# Patient Record
Sex: FEMALE | Employment: OTHER | URBAN - METROPOLITAN AREA
[De-identification: names, ages, dates, MRNs, and addresses within clinical notes are randomized per-mention and may not be internally consistent; named-entity substitution may affect disease eponyms.]

---
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
## 2023-05-18 ENCOUNTER — OFFICE VISIT (OUTPATIENT)
Dept: BEHAVIORAL HEALTH | Facility: CLINIC | Age: 33
End: 2023-05-18
Payer: COMMERCIAL

## 2023-05-18 VITALS
SYSTOLIC BLOOD PRESSURE: 110 MMHG | RESPIRATION RATE: 12 BRPM | OXYGEN SATURATION: 97 % | DIASTOLIC BLOOD PRESSURE: 70 MMHG | WEIGHT: 133.3 LBS | HEART RATE: 60 BPM | HEIGHT: 66 IN | BODY MASS INDEX: 21.42 KG/M2

## 2023-05-18 DIAGNOSIS — F39 UNSPECIFIED MOOD (AFFECTIVE) DISORDER: Primary | ICD-10-CM

## 2023-05-18 DIAGNOSIS — F41.9 ANXIETY DISORDER, UNSPECIFIED TYPE: ICD-10-CM

## 2023-05-18 DIAGNOSIS — G47.00 INSOMNIA, UNSPECIFIED TYPE: ICD-10-CM

## 2023-05-18 DIAGNOSIS — F60.3 BORDERLINE PERSONALITY DISORDER: ICD-10-CM

## 2023-05-18 DIAGNOSIS — F43.10 PTSD (POST-TRAUMATIC STRESS DISORDER): ICD-10-CM

## 2023-05-18 PROBLEM — F32.A DEPRESSION: Chronic | Status: ACTIVE | Noted: 2023-05-18

## 2023-05-18 PROBLEM — F32.A DEPRESSION: Status: ACTIVE | Noted: 2023-05-18

## 2023-05-18 RX ORDER — LAMOTRIGINE 25 MG/1
TABLET ORAL
Qty: 42 TABLET | Refills: 0 | Status: SHIPPED | OUTPATIENT
Start: 2023-05-18 | End: 2023-06-15

## 2023-05-18 RX ORDER — HYDROXYZINE PAMOATE 25 MG/1
CAPSULE ORAL
COMMUNITY
Start: 2023-04-18

## 2023-05-18 RX ORDER — TRAZODONE HYDROCHLORIDE 50 MG/1
TABLET ORAL
Qty: 60 TABLET | Refills: 0 | Status: SHIPPED | OUTPATIENT
Start: 2023-05-18

## 2023-05-18 RX ORDER — TESTOSTERONE CYPIONATE 200 MG/ML
INJECTION, SOLUTION INTRAMUSCULAR
COMMUNITY
Start: 2023-04-19

## 2023-05-18 NOTE — ASSESSMENT & PLAN NOTE
Above listed condition is active, newly identified, moderate intensity, medication changes per orders, condition will be reassessed at next appointment.

## 2023-05-18 NOTE — PATIENT INSTRUCTIONS
Medication changes: Okay to start Lamictal/lamotrigine 25 mg, once a day for 2 weeks then increase to 50 mg a day and continue, monitor for any skin changes or rash if that occurs stop medication immediately and go straight to emergency room do not wait to get a hold of Artie, read attached handout on new medication and rash  Therapy recommendation: EMDR and DBT discussed today, patient encouraged to reach out to establish therapist  Referral placed to [facility] for neuropsych testing  Please call the office with any worsening of symptoms or onset of intolerable side effects. Patient is agreeable to call 911 or go to the nearest ER should he/she/they have any thoughts of harm to self or others.  Patient has been educated regarding multimodal approach with healthy nutrition, healthy sleep, regular physical activity, social activities, counseling, and medications.   Please read attached document on borderline personality disorder    Counseling Resources:    [facility]  [address], [address]  [place] [address]  Phone: [phone]  [email or web address]    [facility] [place] [address]  [facility] [address]   Phone: [phone]  [email or web address]    [facility], [facility]  [address] [place] [address]  [phone]  [email or web address]    [facility]  [address] [place] [address]  [phone]  [email or web address]    [facility]  [address] in [place].   [address]  [phone]  [email or web address]

## 2023-06-01 PROBLEM — F39 UNSPECIFIED MOOD (AFFECTIVE) DISORDER: Status: ACTIVE | Noted: 2023-05-18

## 2023-06-01 NOTE — PROGRESS NOTES
"Patient assessed today via MyChart through EPIC pt is in their car in a secure environment and verbalizes privacy during interview. AJ Alva is at home in a secure environment using a secure laptop. The patient's condition being diagnosed/treated is appropriate for telemedicine. The provider identified himself as well as his credentials.   The patient, and/or patient's guardian, consent to be seen remotely, and when consent is given they understand that the consent allows for patient identifiable information to be sent to a third party as needed.   They may refuse to be seen remotely at any time. The electronic data is encrypted and password protected, and the patient and/or guardian has been advised of the potential risks to privacy not withstanding such measures.    Leonel Gonzalez is a 32 y.o. adult who presents today 06/02/2023     CC: Follow up appt, medication management   .  HPI:    \"I'm doing OK\" per pt.    Patient seen 1 time prior roughly 2 weeks ago at that time Lamictal slow taper was ordered for depression/mood, patient screened positive for borderline personality disorder at that time, patient was instructed to reach out to therapist to discuss trauma focused therapy/EMDR, referral was placed at that time to [facility] for neuropsych testing.    Since then patient reports [facility] was a no go, they were either out of network, patient received list of potential other offices.  Patient educated about [facility], patient reached out to therapist is on a wait list at [facility], sleep is much improved gets 5 to 6-hour blocks now takes on 100 mg of trazodone, reports when they wake up to go to the bathroom that they still have trouble falling back asleep.  Depression/mood overall still has not improved but has been on Lamictal for 2 weeks, patient educated on slow onset, patient denies SI plan or intent.  "     Medical/surgical/social/family history reviewed and updated, problem list reviewed/updated, medications and allergies reviewed/updated.      Social History     Socioeconomic History   • Marital status: Single   • Number of children: 0   • Highest education level: 9th grade   Tobacco Use   • Smoking status: Never     Passive exposure: Yes   • Smokeless tobacco: Never   • Tobacco comments:     every few years i pick smoking back up, presently vaping, no cigarettes.   Vaping Use   • Vaping Use: Every day   • Substances: Nicotine   • Devices: Disposable, Pre-filled or refillable cartridge   Substance and Sexual Activity   • Alcohol use: Never     Comment: Sober since March 2019   • Drug use: Not Currently   • Sexual activity: Not Currently     Partners: Female, Male     Birth control/protection: Condom, I.U.D., Bilateral salpingectomy       Social History     Social History Narrative    Patient lives alone with her dog, works at the fle[employer], denies having any hobbies, highest level of education is ninth grade, reports a history of special education, patient denies having any biological children.      Patient Active Problem List   Diagnosis   • Unspecified mood (affective) disorder   • Anxiety disorder   • PTSD (post-traumatic stress disorder)   • Insomnia   • Borderline personality disorder     History reviewed. No pertinent past medical history.    Allergy:   Allergies   Allergen Reactions   • Rice GI Intolerance     Swollen joints      Current Medications:   Current Outpatient Medications on File Prior to Visit   Medication Sig Dispense Refill   • hydrOXYzine pamoate (VISTARIL) 25 MG capsule TAKE 1 CAPSULE BY MOUTH THREE TIMES DAILY AS NEEDED FOR ITCHING OR ANXIETY     • Testosterone Cypionate (DEPOTESTOTERONE CYPIONATE) 200 MG/ML injection      • [DISCONTINUED] lamoTRIgine (LaMICtal) 25 MG tablet Take 1 tablet by mouth Daily for 14 days, THEN 2 tablets Daily for 14 days. 42 tablet 0   • [DISCONTINUED]  traZODone (DESYREL) 50 MG tablet 1 or 2 by mouth at bedtime as needed for sleep 60 tablet 0     No current facility-administered medications on file prior to visit.     Review of Systems   Constitutional: Negative for recent acute illness.    Respiratory: Negative for chest tightness and shortness of breath.    Cardiovascular: Negative for chest pain.   Neurological: Negative for dizziness and light-headedness.   Musculoskeletal: Negative for recent falls/injuries    Objective   PHQ-9 Depression Screening  Little interest or pleasure in doing things? (P) 3-->nearly every day   Feeling down, depressed, or hopeless? (P) 3-->nearly every day   Trouble falling or staying asleep, or sleeping too much? (P) 3-->nearly every day   Feeling tired or having little energy? (P) 3-->nearly every day   Poor appetite or overeating? (P) 3-->nearly every day   Feeling bad about yourself/you are a failure/have let yourself or your family down? (P) 3-->nearly every day   Trouble concentrating on things? (P) 3-->nearly every day   Psychomotor agitation/retardation (P) 3-->nearly every day   Thoughts about death/dying/suicide (P) 1-->several days   PHQ-9 Total Score (P) 25   How difficult have these problems for you? (P) somewhat difficult     GAD7 Documentation:  Feeling nervous, anxious or on edge (P) 3   Not being able to stop or control worrying (P) 1   Worrying too much about different things (P) 3   Trouble relaxing (P) 3   Being so restless that it is hard to sit still (P) 3   Becoming easily annoyed or irritable (P) 3   Feeling Afraid as if something awful might happen (P) 0   SELAM Total Score (P) 16   How difficult have these problems made it for you? (P) Somewhat difficult     Physical Exam  Constitutional:       Normal appearance, appears in no acute distress  Head:      Head: Normocephalic.   Pulmonary:      Effort: Pulmonary effort is normal, no cough observed   Neurological:      Mental Status: He/she/they are A/Ax3  Skin:          Skin is dry    MENTAL STATUS EXAM   General Appearance:  Cleanly groomed and dressed and well developed  Eye Contact:  Fair  Attitude:  Cooperative  Speech:  Normal rate, tone, volume  Mood and affect:  Normal, pleasant  Thought Process:  Logical  Associations/ Thought Content:  No delusions  Hallucinations:  None  Suicidal Ideations:  Not present  Homicidal Ideation:  Not present  Sensorium:  Alert  Orientation:  Person, place, time and situation  Attention Span/ Concentration:  Good  Fund of Knowledge:  Appropriate for age and educational level  Intellectual Functioning:  Average range  Insight:  Fair  Judgement:  Fair  Reliability:  Fair  Impulse Control:  Fair    Vital Signs:   There were no vitals taken for this visit.   No LMP recorded.     Wt Readings from Last 5 Encounters:   05/18/23 60.5 kg (133 lb 4.8 oz)     BP Readings from Last 5 Encounters:   05/18/23 110/70     Pulse Readings from Last 5 Encounters:   05/18/23 60     Lab Results:  No results found for any previous visit.      EKG Results:  No orders to display     Imaging Results:  No Images in the past 120 days found..    Assessment & Plan   Problem List Items Addressed This Visit        Psychiatry Problems    Anxiety disorder (Chronic)    Relevant Medications    lamoTRIgine (LaMICtal) 100 MG tablet (Start on 6/16/2023)    traZODone (DESYREL) 100 MG tablet    Other Relevant Orders    Ambulatory Referral to Psychology (Completed)    Unspecified mood (affective) disorder - Primary    Relevant Medications    lamoTRIgine (LaMICtal) 100 MG tablet (Start on 6/16/2023)    traZODone (DESYREL) 100 MG tablet    Other Relevant Orders    Ambulatory Referral to Psychology (Completed)    Borderline personality disorder    Relevant Medications    lamoTRIgine (LaMICtal) 100 MG tablet (Start on 6/16/2023)    traZODone (DESYREL) 100 MG tablet    Other Relevant Orders    Ambulatory Referral to Psychology (Completed)       Other    Insomnia    Relevant  Medications    traZODone (DESYREL) 100 MG tablet    Other Relevant Orders    Ambulatory Referral to Psychology (Completed)      (F39) Unspecified mood (affective) disorder - Plan: lamoTRIgine (LaMICtal) 100 MG tablet, Ambulatory Referral to Psychology    (F41.9) Anxiety disorder, unspecified type - Plan: lamoTRIgine (LaMICtal) 100 MG tablet, Ambulatory Referral to Psychology    (G47.00) Insomnia, unspecified type - Plan: traZODone (DESYREL) 100 MG tablet, Ambulatory Referral to Psychology    (F60.3) Borderline personality disorder - Plan: lamoTRIgine (LaMICtal) 100 MG tablet, Ambulatory Referral to Psychology     • Pt history, review of systems, medications, allergies, reviewed, patient was screened today for depression/anxiety, PHQ/SELAM scores reviewed.  Most recent vitals/labs reviewed.  • Pt was given appropriate time to ask questions and concerns were addressed. A thorough discussion was had that included review of disease process, need for continued monitoring and additional treatment options including use of pharmacological and non-pharmacological approaches to care, decisions were made and agreed upon by patient and provider.   • Discussed the risks, benefits, and potential side effects of the medications; patient ackowledged and verbally consented.     Patient Instructions     1. Medication changes: None continue Lamictal slow taper, on 6/16 we will start 100 mg/day and continue new prescription sent to pharmacy.  Continue trazodone 100 mg as needed for sleep, new prescription sent to pharmacy.  2. Monitor for skin changes/rash if they occur stop Lamictal immediately and go to urgent care, do not wait to get a hold of [facility]  3. Therapy recommendation: Patient has not seen established therapist in some time, reports is on a wait list at her office [facility], patient educated a referral can be placed to [facility] if/when needed.  4. Referral placed to [facility] for neuropsych  testing, referral placed to [facility] prior, patient reports [place] does not take insurance.  5. Please call the office with any worsening of symptoms or onset of intolerable side effects. Patient is agreeable to call 911 or go to the nearest ER should he/she/they have any thoughts of harm to self or others.  6. Patient has been educated regarding multimodal approach with healthy nutrition, healthy sleep, regular physical activity, social activities, counseling, and medications.           Return in about 1 month (around 7/2/2023) for Medication Check, Video visit.    Medications Issues:  Medications Discontinued During This Encounter   Medication Reason   • lamoTRIgine (LaMICtal) 25 MG tablet    • traZODone (DESYREL) 50 MG tablet      Orders Placed This Encounter   Procedures   • Ambulatory Referral to Psychology     Referral Priority:   Routine     Referral Type:   Behavorial Health/Psych     Referral Reason:   Specialty Services Required     Referral Location:   [facility] [place]     Requested Specialty:   Psychology     Number of Visits Requested:   1      Barriers: multiple psychiatric comorbidities , stress and personality disorder   Strengths:  positive attitude    Progress toward goal: Not at goal  Functional Status: Moderate impairment   Prognosis: Fair with Ongoing Treatment     No show policy:  We understand unexpected circumstances arise; however, anytime you miss your appointment we are unable to provide you appropriate care.  In addition, each appointment missed could have been used to provide care for others.  We ask that you call at least 24 hours in advance to cancel or reschedule an appointment. We would like to take this opportunity to remind you of our policy stating patients who miss THREE or more appointments without cancelling or rescheduling 24 hours in advance of the appointment may be subject to cancellation of any further visits with our clinic and  recommendation to seek in-person services/visits. Please call [phone] to reschedule your appointment. If there are reasons that make it difficult for you to keep the appointments, please call and let us know how we can help. Please understand that medication prescribing will not continue without seeing your provider.      This document has been electronically signed by MIKKI Goins  June 2, 2023 14:00 EDT    Part of this note may be an electronic transcription/translation of spoken language to printed text using the Dragon Dictation System. Some of the data in this electronic note has been brought forward from a previous encounter, any necessary changes have been made, it has been reviewed by this APRN, and it is accurate.

## 2023-06-02 ENCOUNTER — TELEMEDICINE (OUTPATIENT)
Dept: BEHAVIORAL HEALTH | Facility: CLINIC | Age: 33
End: 2023-06-02

## 2023-06-02 DIAGNOSIS — F39 UNSPECIFIED MOOD (AFFECTIVE) DISORDER: Primary | ICD-10-CM

## 2023-06-02 DIAGNOSIS — F60.3 BORDERLINE PERSONALITY DISORDER: ICD-10-CM

## 2023-06-02 DIAGNOSIS — F41.9 ANXIETY DISORDER, UNSPECIFIED TYPE: ICD-10-CM

## 2023-06-02 DIAGNOSIS — G47.00 INSOMNIA, UNSPECIFIED TYPE: ICD-10-CM

## 2023-06-02 RX ORDER — LAMOTRIGINE 100 MG/1
100 TABLET ORAL DAILY
Qty: 30 TABLET | Refills: 2 | Status: SHIPPED | OUTPATIENT
Start: 2023-06-16

## 2023-06-02 RX ORDER — TRAZODONE HYDROCHLORIDE 100 MG/1
100 TABLET ORAL NIGHTLY PRN
Qty: 30 TABLET | Refills: 2 | Status: SHIPPED | OUTPATIENT
Start: 2023-06-02

## 2023-06-02 NOTE — PATIENT INSTRUCTIONS
Medication changes: None continue Lamictal slow taper, on 6/16 we will start 100 mg/day and continue new prescription sent to pharmacy.  Continue trazodone 100 mg as needed for sleep, new prescription sent to pharmacy.  Monitor for skin changes/rash if they occur stop Lamictal immediately and go to urgent care, do not wait to get a hold of [facility]  Therapy recommendation: Patient has not seen established therapist in some time, reports is [facility] a [facility] list [facility], patient educated a referral can be placed to [facility] if/when needed.  Referral placed to [facility] for neuropsych testing, referral placed to [facility] prior, patient reports [place] does not take insurance.  Please call the office with any worsening of symptoms or onset of intolerable side effects. Patient is agreeable to call 911 or go to the nearest ER should he/she/they have any thoughts of harm to self or others.  Patient has been educated regarding multimodal approach with healthy nutrition, healthy sleep, regular physical activity, social activities, counseling, and medications.

## 2023-06-14 DIAGNOSIS — G47.00 INSOMNIA, UNSPECIFIED TYPE: ICD-10-CM

## 2023-06-14 RX ORDER — TRAZODONE HYDROCHLORIDE 50 MG/1
TABLET ORAL
Qty: 60 TABLET | Refills: 0 | OUTPATIENT
Start: 2023-06-14

## 2023-06-15 DIAGNOSIS — F39 UNSPECIFIED MOOD (AFFECTIVE) DISORDER: ICD-10-CM

## 2023-06-15 DIAGNOSIS — F41.9 ANXIETY DISORDER, UNSPECIFIED TYPE: ICD-10-CM

## 2023-06-15 RX ORDER — LAMOTRIGINE 25 MG/1
TABLET ORAL
Qty: 42 TABLET | Refills: 0 | OUTPATIENT
Start: 2023-06-15

## 2023-06-30 PROBLEM — F41.0 GENERALIZED ANXIETY DISORDER WITH PANIC ATTACKS: Chronic | Status: ACTIVE | Noted: 2023-05-18

## 2023-06-30 PROBLEM — F41.1 GENERALIZED ANXIETY DISORDER WITH PANIC ATTACKS: Chronic | Status: ACTIVE | Noted: 2023-05-18

## 2023-07-28 ENCOUNTER — TELEMEDICINE (OUTPATIENT)
Dept: BEHAVIORAL HEALTH | Facility: CLINIC | Age: 33
End: 2023-07-28
Payer: COMMERCIAL

## 2023-07-28 DIAGNOSIS — F60.3 BORDERLINE PERSONALITY DISORDER: ICD-10-CM

## 2023-07-28 DIAGNOSIS — F43.10 PTSD (POST-TRAUMATIC STRESS DISORDER): ICD-10-CM

## 2023-07-28 DIAGNOSIS — F41.1 GENERALIZED ANXIETY DISORDER WITH PANIC ATTACKS: ICD-10-CM

## 2023-07-28 DIAGNOSIS — G47.00 INSOMNIA, UNSPECIFIED TYPE: ICD-10-CM

## 2023-07-28 DIAGNOSIS — F39 UNSPECIFIED MOOD (AFFECTIVE) DISORDER: Primary | ICD-10-CM

## 2023-07-28 DIAGNOSIS — F41.0 GENERALIZED ANXIETY DISORDER WITH PANIC ATTACKS: ICD-10-CM

## 2023-07-28 NOTE — PATIENT INSTRUCTIONS
Medication changes: None  Therapy recommendation: Continue counseling/therapy with established therapist     Please call the office with any worsening of symptoms or onset of intolerable side effects. Patient is agreeable to call 911 or go to the nearest ER should he/she/they have any thoughts of harm to self or others.  Patient has been educated regarding multimodal approach with healthy nutrition, healthy sleep, regular physical activity, social activities, counseling, and medications.

## 2023-07-28 NOTE — PROGRESS NOTES
Patient assessed today via MyChart through EPIC pt is at home in a secure environment and verbalizes privacy during interview. AJ Alva is at home in a secure environment using a secure laptop.The patient's condition being diagnosed/treated is appropriate for telemedicine.The provider identified himself as well as his credentials.The patient, and/or patient's guardian, consent to be seen remotely, and when consent is given they understand that the consent allows for patient identifiable information to be sent to a third party as needed. They may refuse to be seen remotely at any time. The electronic data is encrypted and password protected, and the patient and/or guardian has been advised of the potential risks to privacy not withstanding such measures.    [facility] PROGRESS NOTE  [facility]  [address], [place] [address]    NAME: Liliana Gonzalez     : 1990   MRN: [id number]     Patient Care Team:  Keila Davey APRN as PCP - General (Nurse Practitioner)  Joelle Allison APRN as Nurse Practitioner (Psychiatry)    DATE: 2023    Subjective     Chief Complaint   Patient presents with    Depression    Anxiety    Insomnia    Follow-up      HPI:  32 y.o. adult established patient of [facility] seen today for F/U. Since last appt pt reports improvement in condition being treated, diagnosis listed below. Pt endorses daily compliance with psychiatric medications. Since last appt pt denies new medications, pt denies new medical problems. Current S/S reviewed with pt, PHQ/SELAM scores reviewed with pt and are stable. Sleep disturbance is denied, sleep is described as generally restful overall. No side effects to meds are currently reported or in evidence during assessment.  The patient would like to not adjust or change their medications at this time.                Specialty Problems          Psychiatry Problems    Borderline personality disorder        Generalized  anxiety disorder with panic attacks        PTSD (post-traumatic stress disorder)        Unspecified mood (affective) disorder          Social History     Social History Narrative    Patient lives alone with her dog, works at the [employer], denies having any hobbies, highest level of education is ninth grade, reports a history of special education, patient denies having any biological children.      Social History     Occupational History    Occupation: [employer]   Tobacco Use    Smoking status: Never     Passive exposure: Yes    Smokeless tobacco: Never    Tobacco comments:     every few years i pick smoking back up, presently vaping, no cigarettes.   Vaping Use    Vaping Use: Every day    Substances: Nicotine    Devices: Disposable, Pre-filled or refillable cartridge   Substance and Sexual Activity    Alcohol use: Never     Comment: Sober since March 2019    Drug use: Not Currently    Sexual activity: Not Currently     Partners: Female, Male     Birth control/protection: Condom, I.U.D., Bilateral salpingectomy      Family History   Problem Relation Age of Onset    Depression Mother     Anxiety disorder Mother     Schizophrenia Mother     Drug abuse Mother     Alcohol abuse Mother     Learning disabilities Mother     OCD Mother     Learning disabilities Father     Alcohol abuse Father     Drug abuse Father     Depression Father     ADD / ADHD Father     Self-Injurious Behavior  Sister     Suicide Attempts Sister     Alcohol abuse Maternal Grandmother         great grandmother Modesta figueroa    Anxiety disorder Maternal Grandmother     OCD Maternal Grandmother     Alcohol abuse Maternal Grandfather       History reviewed. No pertinent past medical history.    REVIEW OF SYSTEMS:  Constitutional: Negative for recent acute illness.    Respiratory: Negative for chest tightness and shortness of breath.    Cardiovascular: Negative for chest pain.   Neurological: Negative for dizziness and  light-headedness.   Musculoskeletal: Negative for recent falls/injuries    Objective   PHYSICAL EXAM:  Constitutional:       Normal appearance, appears in no acute distress  Head:      Head: Normocephalic.   Pulmonary:      Effort: Pulmonary effort is normal, no cough observed   Neurological:      Mental Status: He/she/they are A/Ax3  Skin:         Skin is dry    There were no vitals taken for this visit.  No LMP recorded.    PHQ-9 Depression Screening  Little interest or pleasure in doing things? (P) 1-->several days   Feeling down, depressed, or hopeless? (P) 1-->several days   Trouble falling or staying asleep, or sleeping too much? (P) 0-->not at all   Feeling tired or having little energy?     Poor appetite or overeating? (P) 3-->nearly every day   Feeling bad about yourself/you are a failure/have let yourself or your family down? (P) 0-->not at all   Trouble concentrating on things? (P) 3-->nearly every day   Psychomotor agitation/retardation (P) 0-->not at all   Thoughts about death/dying/suicide (P) 0-->not at all   PHQ-9 Total Score (P) 11   How difficult have these problems for you? (P) not difficult at all     GAD7 Documentation:  Feeling nervous, anxious or on edge (P) 1   Not being able to stop or control worrying (P) 0   Worrying too much about different things (P) 3   Trouble relaxing (P) 2   Being so restless that it is hard to sit still (P) 3   Becoming easily annoyed or irritable (P) 0   Feeling Afraid as if something awful might happen (P) 0   SELAM Total Score (P) 9   How difficult have these problems made it for you? (P) Very difficult     MENTAL STATUS EXAM   General Appearance:  Cleanly groomed and dressed and well developed  Eye Contact:  Fair  Attitude:  Cooperative  Motor Activity:  Normal posture  Speech:  Normal rate, tone, volume  Mood and affect:  Normal, pleasant  Thought Process:  Logical  Associations/ Thought Content:  No delusions  Hallucinations:  None  Suicidal Ideations:  Not  present  Homicidal Ideation:  Not present  Sensorium:  Alert  Orientation:  Person, place, time and situation  Attention Span/ Concentration:  Good  Fund of Knowledge:  Appropriate for age and educational level  Intellectual Functioning:  Average range  Insight:  Fair  Judgement:  Fair  Reliability:  Fair  Impulse Control:  Fair    LABS:  No visits with results within 3 Month(s) from this visit.   Latest known visit with results is:   No results found for any previous visit.      ALLERGY:  Allergies   Allergen Reactions    Rice GI Intolerance     Swollen joints        Current Outpatient Medications on File Prior to Visit   Medication Sig    busPIRone (BUSPAR) 10 MG tablet Take 1 tablet by mouth 2 (Two) Times a Day.    lamoTRIgine (LaMICtal) 100 MG tablet Take 1 tablet by mouth Daily. DON'T START UNTIL AFTER BEING ON 50 MG X 2 WEEKS    Testosterone Cypionate (DEPOTESTOTERONE CYPIONATE) 200 MG/ML injection     traZODone (DESYREL) 100 MG tablet Take 1 tablet by mouth At Night As Needed for Sleep.     No current facility-administered medications on file prior to visit.      Assessment    ASSESSMENT/PLAN/INSTRUCTIONS/EDUCATION    (F39) Unspecified mood (affective) disorder    (F60.3) Borderline personality disorder    (F41.1,  F41.0) Generalized anxiety disorder with panic attacks    (G47.00) Insomnia, unspecified type    (F43.10) PTSD (post-traumatic stress disorder)     -The above listed condition is improving with treatment, continue current treatment plan, med changes per orders. Pt instructed to monitor for improvement/worsening S/S and report to [facility] immediately. Condition will be re-assessed at next F/U appt.    PT INSTRUCTIONS FROM AVS:  Medication changes: None  Therapy recommendation: Continue counseling/therapy with established therapist     Please call the office with any worsening of symptoms or onset of intolerable side effects. Patient is agreeable to call 911 or go to the nearest ER should he/she/they  have any thoughts of harm to self or others.  Patient has been educated regarding multimodal approach with healthy nutrition, healthy sleep, regular physical activity, social activities, counseling, and medications.     Return in about 1 month (around 8/28/2023) for Medication Check.    Future Appointments         Provider Department Center    8/28/2023 9:30 AM Rafal Allison APRN [facility]           There are no discontinued medications.      No orders of the defined types were placed in this encounter.    -Barriers: multiple psychiatric comorbidities , stress, and personality disorder   -Strengths:  motivated     -Progress toward goal: Not at goal  -Functional Status: Moderate impairment   -Prognosis: Fair with Ongoing Treatment        SUMMARY/DISCUSSION:  Pt past social/medical/family history reviewed/updated. ROS conducted, medications/allergies, reviewed, patient was screened today for depression/anxiety, PHQ/SELAM scores reviewed.  Most recent vitals/labs reviewed. Pt was given appropriate time to ask questions and concerns were addressed. A thorough discussion was had that included review of disease process, need for continued monitoring and additional treatment options including use of pharmacological and non-pharmacological approaches to care, decisions were made and agreed upon by patient and provider. Discussed the risks, benefits, and potential side effects of the medications; patient ackowledged and verbally consented.     Part of this note may be an electronic transcription/translation of spoken language to printed text using the Dragon Dictation System. Some of the data in this electronic note has been brought forward from a previous encounter, any necessary changes have been made, it has been reviewed by this MIKKI, and it is accurate.    This document has been electronically signed by MIKKI Goins July 28, 2023 09:54 EDT

## 2023-08-27 PROBLEM — F33.1 MDD (MAJOR DEPRESSIVE DISORDER), RECURRENT EPISODE, MODERATE: Status: ACTIVE | Noted: 2023-05-18

## 2023-08-27 NOTE — PROGRESS NOTES
Patient assessed today via MyChart through [facility] pt is at home in a secure environment and verbalizes privacy during interview. AJ Alva is at home in a secure environment using a secure laptop.The patient's condition being diagnosed/treated is appropriate for telemedicine.The provider identified himself as well as his credentials.The patient, and/or patient's guardian, consent to be seen remotely, and when consent is given they understand that the consent allows for patient identifiable information to be sent to a third party as needed. They may refuse to be seen remotely at any time. The electronic data is encrypted and password protected, and the patient and/or guardian has been advised of the potential risks to privacy not withstanding such measures.    [facility] PROGRESS NOTE  JOLELE ALLISON [facility]  [address], [place] [address]    NAME: Liliana Gonzalez     : 1990   MRN: [id number]     Patient Care Team:  Keila Davey APRN as PCP - General (Nurse Practitioner)  Joelle Allison APRN as Nurse Practitioner (Psychiatry)    DATE: 2023    Subjective     Chief Complaint   Patient presents with    Depression    Anxiety    Insomnia    Follow-up      HPI:  32 y.o. adult established patient of Artie Allison [facility] seen today for F/U.  Patient seen last roughly 1 month ago for multiple psych issues, no medication changes were deemed appropriate at that time patient was to continue Lamictal 100 mg a day, BuSpar 10 mg twice daily, and trazodone 100 mg as needed, since that time patient reports worsening anxiety for the past month, no specific trigger identified, patient has no idea why they have felt more anxious, denies new medications or new medical problems, sleep continues to be poor, patient reports they toss and turn, is currently seeing therapist Maria Pizarro bimonthly in the past was seen weekly reports they agreed to less frequent visits due to improvement in depression,  patient denies SI plan or intent, positive coping skills discussed, grounding exercises discussed, fall activities discussed, patient has upcoming trip to [facility] for camping in October they are looking forward to.                Specialty Problems          Psychiatry Problems    Borderline personality disorder        Generalized anxiety disorder with panic attacks        MDD (major depressive disorder), recurrent episode, moderate        PTSD (post-traumatic stress disorder)          Social History     Social History Narrative    Patient lives alone with her dog, works at the [employer], denies having any hobbies, highest level of education is ninth grade, reports a history of special education, patient denies having any biological children.      Social History     Occupational History    Occupation: [employer]   Tobacco Use    Smoking status: Never     Passive exposure: Yes    Smokeless tobacco: Never    Tobacco comments:     every few years i pick smoking back up, presently vaping, no cigarettes.   Vaping Use    Vaping Use: Every day    Substances: Nicotine    Devices: Disposable, Pre-filled or refillable cartridge   Substance and Sexual Activity    Alcohol use: Never     Comment: Sober since March 2019    Drug use: Not Currently    Sexual activity: Not Currently     Partners: Female, Male     Birth control/protection: Condom, I.U.D., Bilateral salpingectomy      Family History   Problem Relation Age of Onset    Depression Mother     Anxiety disorder Mother     Schizophrenia Mother     Drug abuse Mother     Alcohol abuse Mother     Learning disabilities Mother     OCD Mother     Learning disabilities Father     Alcohol abuse Father     Drug abuse Father     Depression Father     ADD / ADHD Father     Self-Injurious Behavior  Sister     Suicide Attempts Sister     Alcohol abuse Maternal Grandmother         great grandmother Modesta figueroa    Anxiety disorder Maternal Grandmother     OCD  Maternal Grandmother     Alcohol abuse Maternal Grandfather       History reviewed. No pertinent past medical history.    REVIEW OF SYSTEMS:  Constitutional: Negative for recent acute illness.    Respiratory: Negative for chest tightness and shortness of breath.    Cardiovascular: Negative for chest pain.   Neurological: Negative for dizziness and light-headedness.   Musculoskeletal: Negative for recent falls/injuries    Objective   PHYSICAL EXAM:  Constitutional:       Normal appearance, appears in no acute distress  Head:      Head: Normocephalic.   Pulmonary:      Effort: Pulmonary effort is normal, no cough observed   Neurological:      Mental Status: He/she/they are A/Ax3  Skin:         Skin is dry    There were no vitals taken for this visit.  No LMP recorded.    PHQ-9 Depression Screening  Little interest or pleasure in doing things? (P) 1-->several days   Feeling down, depressed, or hopeless? (P) 1-->several days   Trouble falling or staying asleep, or sleeping too much? (P) 3-->nearly every day   Feeling tired or having little energy?     Poor appetite or overeating? (P) 2-->more than half the days   Feeling bad about yourself/you are a failure/have let yourself or your family down? (P) 1-->several days   Trouble concentrating on things? (P) 3-->nearly every day   Psychomotor agitation/retardation (P) 0-->not at all   Thoughts about death/dying/suicide (P) 0-->not at all   PHQ-9 Total Score (P) 14   How difficult have these problems for you? (P) somewhat difficult     GAD7 Documentation:  Feeling nervous, anxious or on edge (P) 3   Not being able to stop or control worrying (P) 3   Worrying too much about different things (P) 3   Trouble relaxing (P) 3   Being so restless that it is hard to sit still (P) 3   Becoming easily annoyed or irritable (P) 1   Feeling Afraid as if something awful might happen (P) 1   SELAM Total Score (P) 17   How difficult have these problems made it for you? (P) Very difficult      MENTAL STATUS EXAM   General Appearance:  Cleanly groomed and dressed  Eye Contact:  Good eye contact  Attitude:  Cooperative  Motor Activity:  Normal gait, posture  Speech:  Normal rate, tone, volume  Mood and affect:  Anxious  Thought Process:  Goal-directed  Associations/ Thought Content:  No delusions  Hallucinations:  None  Suicidal Ideations:  Not present  Homicidal Ideation:  Not present  Orientation:  Person, place, time and situation  Fund of Knowledge:  Appropriate for age and educational level  Intellectual Functioning:  Average range  Insight:  Fair  Judgement:  Fair  Reliability:  Fair  Impulse Control:  Fair     LABS:  No visits with results within 3 Month(s) from this visit.   Latest known visit with results is:   No results found for any previous visit.      ALLERGY:  Allergies   Allergen Reactions    Rice GI Intolerance     Swollen joints        Current Outpatient Medications on File Prior to Visit   Medication Sig    Testosterone Cypionate (DEPOTESTOTERONE CYPIONATE) 200 MG/ML injection     [DISCONTINUED] busPIRone (BUSPAR) 10 MG tablet Take 1 tablet by mouth 2 (Two) Times a Day.    [DISCONTINUED] lamoTRIgine (LaMICtal) 100 MG tablet Take 1 tablet by mouth Daily. DON'T START UNTIL AFTER BEING ON 50 MG X 2 WEEKS    [DISCONTINUED] traZODone (DESYREL) 100 MG tablet Take 1 tablet by mouth At Night As Needed for Sleep.     No current facility-administered medications on file prior to visit.      Assessment    ASSESSMENT/PLAN/INSTRUCTIONS/EDUCATION    PSYCHOTHERAPY:  In/Start Time: 0930.  Out/Stop Time: 0946.  16 minutes of face to face direct patient care with patient was spent for supportive psychotherapy including strengthening self awareness and insights, strengthening coping skills, counseling the patient regarding diagnoses, and utilizing cognitive behavioral therapy to assist the patient in recognizing more appropriate coping mechanisms which are proven effective in reducing the severity of  frequency of symptoms.  This APRN assisted the patient in processing the patient's diagnoses, and also acknowledged and normalized the patient's thoughts, feelings, and concerns This APRN allowed the patient to freely discuss issues without interruption or judgment.      (F41.1,  F41.0) Generalized anxiety disorder with panic attacks - Plan: busPIRone (BUSPAR) 10 MG tablet, lamoTRIgine (LaMICtal) 100 MG tablet    (F33.1) MDD (major depressive disorder), recurrent episode, moderate - Plan: busPIRone (BUSPAR) 10 MG tablet, lamoTRIgine (LaMICtal) 100 MG tablet, traZODone (DESYREL) 100 MG tablet    (F60.3) Borderline personality disorder - Plan: busPIRone (BUSPAR) 10 MG tablet, lamoTRIgine (LaMICtal) 100 MG tablet    (G47.00) Insomnia, unspecified type - Plan: busPIRone (BUSPAR) 10 MG tablet, lamoTRIgine (LaMICtal) 100 MG tablet, traZODone (DESYREL) 100 MG tablet     -Anxiety, worsening with treatment  -Depression/mood, stable with treatment  -Insomnia, worsening with treatment, tied to racing thoughts at night and anxiety  -Borderline personality disorder, unchanged    PT INSTRUCTIONS FROM AVS:  Medication changes: Increase BuSpar to 20 mg twice a day with small amount of food for worsening anxiety, continue Lamictal and trazodone as previously ordered for mood/depression/insomnia.    Therapy recommendation: Continue counseling/therapy with established therapist Maria Pizarro is currently seeing bimonthly, consider increasing frequency of appointments    Increase positive coping skills discussed today, increase grounding exercises discussed today, increase deep breathing exercises, fall activities discussed, hobbies/interest discussed    Please call the office with any worsening of symptoms or onset of intolerable side effects. Patient is agreeable to call 911 or go to the nearest ER should he/she/they have any thoughts of harm to self or others.    Patient has been educated regarding multimodal approach with healthy  nutrition, healthy sleep, regular physical activity, social activities, counseling, and medications.     Return in 4 weeks (on 9/25/2023) for Medication Check, Video visit.    Future Appointments         Provider Department Center    9/28/2023 10:30 AM Rafal Allison APRN [facility] [place]             Medications Discontinued During This Encounter   Medication Reason    lamoTRIgine (LaMICtal) 100 MG tablet Reorder    traZODone (DESYREL) 100 MG tablet Reorder    busPIRone (BUSPAR) 10 MG tablet Reorder     New Medications Ordered This Visit   Medications    busPIRone (BUSPAR) 10 MG tablet     Sig: Take 2 tablets by mouth 2 (Two) Times a Day.     Dispense:  120 tablet     Refill:  0    lamoTRIgine (LaMICtal) 100 MG tablet     Sig: Take 1 tablet by mouth Daily.     Dispense:  30 tablet     Refill:  0    traZODone (DESYREL) 100 MG tablet     Sig: Take 1 tablet by mouth At Night As Needed for Sleep.     Dispense:  30 tablet     Refill:  0      No orders of the defined types were placed in this encounter.    -Barriers: multiple psychiatric comorbidities , stress, and personality disorder   -Strengths:  motivated     -Progress toward goal: Not at goal  -Functional Status: Moderate impairment   -Prognosis: Fair with Ongoing Treatment        SUMMARY/DISCUSSION:  Pt past social/medical/family history reviewed/updated. ROS conducted, medications/allergies, reviewed, patient was screened today for depression/anxiety, PHQ/SELAM scores reviewed.  Most recent vitals/labs reviewed. Pt was given appropriate time to ask questions and concerns were addressed. A thorough discussion was had that included review of disease process, need for continued monitoring and additional treatment options including use of pharmacological and non-pharmacological approaches to care, decisions were made and agreed upon by patient and provider. Discussed the risks, benefits, and potential side effects of the  medications; patient ackowledged and verbally consented.     Part of this note may be an electronic transcription/translation of spoken language to printed text using the Dragon Dictation System. Some of the data in this electronic note has been brought forward from a previous encounter, any necessary changes have been made, it has been reviewed by this APRN, and it is accurate.    This document has been electronically signed by MIKKI Goins August 28, 2023 09:55 EDT

## 2023-08-28 ENCOUNTER — TELEMEDICINE (OUTPATIENT)
Dept: BEHAVIORAL HEALTH | Facility: CLINIC | Age: 33
End: 2023-08-28
Payer: COMMERCIAL

## 2023-08-28 DIAGNOSIS — F41.1 GENERALIZED ANXIETY DISORDER WITH PANIC ATTACKS: Primary | ICD-10-CM

## 2023-08-28 DIAGNOSIS — F41.0 GENERALIZED ANXIETY DISORDER WITH PANIC ATTACKS: Primary | ICD-10-CM

## 2023-08-28 DIAGNOSIS — F33.1 MDD (MAJOR DEPRESSIVE DISORDER), RECURRENT EPISODE, MODERATE: ICD-10-CM

## 2023-08-28 DIAGNOSIS — G47.00 INSOMNIA, UNSPECIFIED TYPE: ICD-10-CM

## 2023-08-28 DIAGNOSIS — F60.3 BORDERLINE PERSONALITY DISORDER: ICD-10-CM

## 2023-08-28 RX ORDER — BUSPIRONE HYDROCHLORIDE 10 MG/1
20 TABLET ORAL 2 TIMES DAILY
Qty: 120 TABLET | Refills: 0 | Status: SHIPPED | OUTPATIENT
Start: 2023-08-28

## 2023-08-28 RX ORDER — TRAZODONE HYDROCHLORIDE 100 MG/1
100 TABLET ORAL NIGHTLY PRN
Qty: 30 TABLET | Refills: 0 | Status: SHIPPED | OUTPATIENT
Start: 2023-08-28

## 2023-08-28 RX ORDER — LAMOTRIGINE 100 MG/1
100 TABLET ORAL DAILY
Qty: 30 TABLET | Refills: 0 | Status: SHIPPED | OUTPATIENT
Start: 2023-08-28

## 2023-08-28 NOTE — PATIENT INSTRUCTIONS
Medication changes: Increase BuSpar to 20 mg twice a day with small amount of food for worsening anxiety, continue Lamictal and trazodone as previously ordered for mood/depression/insomnia.    Therapy recommendation: Continue counseling/therapy with established therapist Maria Pizarro is currently seeing bimonthly, consider increasing frequency of appointments    Increase positive coping skills discussed today, increase grounding exercises discussed today, increase deep breathing exercises, fall activities discussed, hobbies/interest discussed    Please call the office with any worsening of symptoms or onset of intolerable side effects. Patient is agreeable to call 911 or go to the nearest ER should he/she/they have any thoughts of harm to self or others.    Patient has been educated regarding multimodal approach with healthy nutrition, healthy sleep, regular physical activity, social activities, counseling, and medications.

## 2023-09-24 DIAGNOSIS — F60.3 BORDERLINE PERSONALITY DISORDER: ICD-10-CM

## 2023-09-24 DIAGNOSIS — F33.1 MDD (MAJOR DEPRESSIVE DISORDER), RECURRENT EPISODE, MODERATE: ICD-10-CM

## 2023-09-24 DIAGNOSIS — F41.1 GENERALIZED ANXIETY DISORDER WITH PANIC ATTACKS: ICD-10-CM

## 2023-09-24 DIAGNOSIS — G47.00 INSOMNIA, UNSPECIFIED TYPE: ICD-10-CM

## 2023-09-24 DIAGNOSIS — F41.0 GENERALIZED ANXIETY DISORDER WITH PANIC ATTACKS: ICD-10-CM

## 2023-09-24 RX ORDER — TRAZODONE HYDROCHLORIDE 100 MG/1
100 TABLET ORAL NIGHTLY PRN
Qty: 30 TABLET | Refills: 0 | Status: SHIPPED | OUTPATIENT
Start: 2023-09-24

## 2023-09-24 RX ORDER — LAMOTRIGINE 100 MG/1
100 TABLET ORAL DAILY
Qty: 30 TABLET | Refills: 0 | Status: SHIPPED | OUTPATIENT
Start: 2023-09-24

## 2023-09-26 DIAGNOSIS — F60.3 BORDERLINE PERSONALITY DISORDER: ICD-10-CM

## 2023-09-26 DIAGNOSIS — F41.1 GENERALIZED ANXIETY DISORDER WITH PANIC ATTACKS: ICD-10-CM

## 2023-09-26 DIAGNOSIS — F41.0 GENERALIZED ANXIETY DISORDER WITH PANIC ATTACKS: ICD-10-CM

## 2023-09-26 DIAGNOSIS — G47.00 INSOMNIA, UNSPECIFIED TYPE: ICD-10-CM

## 2023-09-26 DIAGNOSIS — F33.1 MDD (MAJOR DEPRESSIVE DISORDER), RECURRENT EPISODE, MODERATE: ICD-10-CM

## 2023-09-26 RX ORDER — BUSPIRONE HYDROCHLORIDE 10 MG/1
TABLET ORAL
Qty: 120 TABLET | Refills: 0 | Status: SHIPPED | OUTPATIENT
Start: 2023-09-26

## 2023-09-26 NOTE — PROGRESS NOTES
Patient assessed today via MyChart through [facility] pt is at home in a secure environment and verbalizes privacy during interview. AJ Alva is at home in a secure environment using a secure laptop.The patient's condition being diagnosed/treated is appropriate for telemedicine.The provider identified himself as well as his credentials.The patient, and/or patient's guardian, consent to be seen remotely, and when consent is given they understand that the consent allows for patient identifiable information to be sent to a third party as needed. They may refuse to be seen remotely at any time. The electronic data is encrypted and password protected, and the patient and/or guardian has been advised of the potential risks to privacy not withstanding such measures.    Subjective    PATIENT ID: Liliana Gonzalez, :1990, MRN: [id number]    Chief Complaint   Patient presents with    Anxiety    Depression    Insomnia     HPI:   32 y.o. adult pt presents to [facility] 2023.  Patient seen last roughly 1 month ago and BuSpar dose was doubled for worsening anxiety, since then patient reports overall they are doing very well to the point therapist only wants to see them monthly, reports having a 1 week where they felt more depressed but were able to come out of it, minor sleep issues reported on/off which are baseline, no SI, initially higher dose of BuSpar led to some mild lethargy that has improved, gets little to no exercise, has a lot of side gigs for employment, reports they are financially stable.    SUBSTANCE/SEXUAL HISTORY:   reports that Liliana Gonzalez has never smoked. Liliana Gonzalez has been exposed to tobacco smoke. Liliana Gonzalez has never used smokeless tobacco. Liliana Gonzalez reports that Liliana Gonzalez does not currently use drugs. Liliana Gonzalez reports that Liliana Gonzalez does not drink alcohol.   reports that Liliana Gonzalez is not currently sexually active and has had partner(s) who are female  and male. Liliana Gonzalez reports using the following methods of birth control/protection: Condom, I.U.D., and Bilateral salpingectomy .      FAMILY HISTORY:  family history includes ADD / ADHD in Liliana Gonzalez's father; Alcohol abuse in Liliana Gonzalez's father, maternal grandfather, maternal grandmother, and mother; Anxiety disorder in Liliana Gonzalez's maternal grandmother and mother; Depression in Liliana Gonzalez's father and mother; Drug abuse in Liliana Gonzalez's father and mother; Learning disabilities in Liliana Gonzalez's father and mother; OCD in Liliana Gonzalez's maternal grandmother and mother; Schizophrenia in Liliana Gonzalez's mother; Self-Injurious Behavior  in Liliana Gonzalez's sister; Suicide Attempts in Liliana Gonzalez's sister.     PAST MEDICAL HISTORY   has a past medical history of ETOH abuse.    Liliana Gonzalez has no past medical history of Disease of thyroid gland, Liver disease, Renal insufficiency, or Suicide attempt.     Review of Systems   Constitutional: Negative.    Respiratory:  Negative for chest tightness and shortness of breath.    Cardiovascular:  Negative for chest pain.   Gastrointestinal:  Negative for nausea and vomiting.   Neurological:  Negative for dizziness and light-headedness.   Psychiatric/Behavioral:  Positive for sleep disturbance and depressed mood. Negative for suicidal ideas.      Objective   There were no vitals taken for this visit.     Physical Exam  Constitutional:       Appearance: Normal appearance.   HENT:      Head: Normocephalic.   Pulmonary:      Effort: Pulmonary effort is normal.   Skin:     General: Skin is dry.   Neurological:      General: No focal deficit present.      Mental Status: Liliana Gonzalez is alert and oriented to person, place, and time.   Psychiatric:         Behavior: Behavior normal.         Thought Content: Thought content normal.     MENTAL STATUS EXAM   General Appearance:  Cleanly groomed and dressed  Eye Contact:  Good eye contact  Attitude:  Cooperative  Motor Activity:  Normal  gait, posture  Speech:  Normal rate, tone, volume  Mood and affect:  Normal, pleasant  Thought Process:  Goal-directed  Associations/ Thought Content:  No delusions  Hallucinations:  None  Suicidal Ideations:  Not present  Homicidal Ideation:  Not present  Orientation:  Person, place, time and situation  Fund of Knowledge:  Appropriate for age and educational level  Intellectual Functioning:  Average range  Insight:  Fair  Judgement:  Fair  Reliability:  Fair  Impulse Control:  Fair    PHQ-9 Depression Screening  Little interest or pleasure in doing things?     Feeling down, depressed, or hopeless? (P) 1-->several days   Trouble falling or staying asleep, or sleeping too much? (P) 2-->more than half the days   Feeling tired or having little energy?     Poor appetite or overeating? (P) 2-->more than half the days   Feeling bad about yourself/you are a failure/have let yourself or your family down? (P) 1-->several days   Trouble concentrating on things? (P) 3-->nearly every day   Psychomotor agitation/retardation (P) 0-->not at all   Thoughts about death/dying/suicide (P) 0-->not at all   PHQ-9 Total Score (P) 10   How difficult have these problems for you? (P) not difficult at all     GAD7 Documentation:  Feeling nervous, anxious or on edge (P) 1   Not being able to stop or control worrying (P) 0   Worrying too much about different things (P) 0   Trouble relaxing (P) 3   Being so restless that it is hard to sit still (P) 2   Becoming easily annoyed or irritable (P) 1   Feeling Afraid as if something awful might happen (P) 0   SELAM Total Score (P) 7   How difficult have these problems made it for you? (P) Somewhat difficult     LABS:  No visits with results within 1 Month(s) from this visit.   Latest known visit with results is:   No results found for any previous visit.        Allergies   Allergen Reactions    Rice GI Intolerance     Swollen joints        Current Outpatient Medications   Medication Instructions     busPIRone (BUSPAR) 10 MG tablet TAKE 2 TABLETS BY MOUTH TWICE DAILY    lamoTRIgine (LAMICTAL) 100 mg, Oral, Daily    Testosterone Cypionate (DEPOTESTOTERONE CYPIONATE) 200 MG/ML injection No dose, route, or frequency recorded.    traZODone (DESYREL) 100 mg, Oral, Nightly PRN      Assessment    ASSESSMENT/TREATMENT PLAN/INSTRUCTIONS/EDUCATION     (F41.1,  F41.0) Generalized anxiety disorder with panic attacks    (F33.1) MDD (major depressive disorder), recurrent episode, moderate    (F60.3) Borderline personality disorder     -Anxiety/depression/borderline: Improved with higher dose of BuSpar and current dose of Lamictal.  Uses trazodone only as needed for sleep, reports they are now only seeing therapist once a month due to improvement in symptoms per therapist recommendation.  Patient continues to have mild awakenings at night, does not take trazodone consistently, has tried melatonin in the past that led to nightmares, reports using CBD Gummies which I am fine with for the time being.  Positive coping skills discussed including music, life shows, light/moderate exercise, fall activities.    AFTER VISIT SUMMARY INSTRUCTIONS:    Medication changes:    Continue BuSpar, Lamictal, trazodone as previously ordered    Therapy recommendation: Maria Pizarro is currently seeing monthly  Increase positive coping skills discussed today, increase grounding exercises discussed today, increase deep breathing exercises, fall activities discussed, hobbies/interest discussed    -Please call the office at [phone] with any worsening of symptoms or onset of intolerable side effects, please ask to leave a message with Artie's medical assistant.  Please give my office up to 48 hours to respond to a patient call/question/refill request.  -Patient is agreeable to call 911 or go to the nearest ER should he/she/they have any thoughts of harm to self or others.  -Patient has been educated on providers schedule, contact information, and  patient/provider expectations.     FOLLOW UP: Return in 3 months (on 12/28/2023) for SELAM, MDD, NO MED CHANGES.    MEDICATION ISSUES:  DEBI: Reviewed during F/U appt's via interface.    There are no discontinued medications.      No orders of the defined types were placed in this encounter.    -Barriers: multiple psychiatric comorbidities , stress, and personality disorder   -Strengths:  motivated     -Progress toward goal:  At goal  -Functional Status: Moderate impairment   -Prognosis: Fair with Ongoing Treatment        SUMMARY/DISCUSSION:  Pt past social/medical/family history reviewed/updated. ROS conducted, medications/allergies, reviewed.  Most recent vitals/labs reviewed. Pt was given appropriate time to ask questions and concerns were addressed. A thorough discussion was had that included review of disease process, need for continued monitoring and additional treatment options including use of pharmacological and non-pharmacological approaches to care, decisions were made and agreed upon by patient and provider. Discussed the risks, benefits, and potential side effects of the medications; patient ackowledged and verbally consented.     Part of this note may be an electronic transcription/translation of spoken language to printed text using the Dragon Dictation System. Some of the data in this electronic note has been brought forward from a previous encounter, any necessary changes have been made, it has been reviewed by this APRN, and it is accurate.    This document has been electronically signed by MIKKI Goins September 28, 2023 10:53 EDT

## 2023-09-28 ENCOUNTER — TELEMEDICINE (OUTPATIENT)
Dept: BEHAVIORAL HEALTH | Facility: CLINIC | Age: 33
End: 2023-09-28
Payer: COMMERCIAL

## 2023-09-28 DIAGNOSIS — F41.0 GENERALIZED ANXIETY DISORDER WITH PANIC ATTACKS: Primary | ICD-10-CM

## 2023-09-28 DIAGNOSIS — F33.1 MDD (MAJOR DEPRESSIVE DISORDER), RECURRENT EPISODE, MODERATE: ICD-10-CM

## 2023-09-28 DIAGNOSIS — F60.3 BORDERLINE PERSONALITY DISORDER: ICD-10-CM

## 2023-09-28 DIAGNOSIS — F41.1 GENERALIZED ANXIETY DISORDER WITH PANIC ATTACKS: Primary | ICD-10-CM

## 2023-09-28 NOTE — PATIENT INSTRUCTIONS
Medication changes:    Continue BuSpar, Lamictal, trazodone as previously ordered    Therapy recommendation: Maria Pizarro is currently seeing monthly  Increase positive coping skills discussed today, increase grounding exercises discussed today, increase deep breathing exercises, fall activities discussed, hobbies/interest discussed    GENERAL NEW PATIENT INSTRUCTIONS:  -The best way to get a hold of Artie is to call the office at [phone] and ask to leave a message with his medical assistant.  Patient's are not able to message their mental health provider directly via [employer], please give my office up to 48 hours to respond to a patient call/question/refill request.  -Please call 911 or go to the nearest ER if you begin to have thoughts of harming yourself or other people.  -Refill requests will be made during normal office hours, Monday-Friday 8:00-5:30.  Artie is out of the office on Wednesdays and weekends.  Follow-up appointments must be maintained in order for prescribing to continue.    -Tuesdays and Thursdays are Artie's in-office days, Mondays and Fridays are his telehealth days.  The decision to be seen virtually or in person is up to the discretion of the provider, not all behavioral health problems are appropriate for telehealth.    NO SHOW POLICY:  We understand unexpected circumstances arise; however, anytime you miss your appointment we are unable to provide you appropriate care.  In addition, each appointment missed could have been used to provide care for others.  We ask that you call at least 24 hours in advance to cancel or reschedule an appointment. We would like to take this opportunity to remind you of our policy stating patients who miss THREE or more appointments without cancelling or rescheduling 24 hours in advance of the appointment may be subject to cancellation of any further visits with our clinic and recommendation to seek in-person services/visits. Please call [phone] to  reschedule your appointment. If there are reasons that make it difficult for you to keep the appointments, please call and let us know how we can help. Please understand that medication prescribing will not continue without seeing your provider.       [facility]   [address] [place] [id number]  P: [phone]  F: [phone]

## 2023-10-03 ENCOUNTER — TELEPHONE (OUTPATIENT)
Dept: FAMILY MEDICINE CLINIC | Facility: CLINIC | Age: 33
End: 2023-10-03
Payer: COMMERCIAL

## 2023-10-03 DIAGNOSIS — F33.1 MDD (MAJOR DEPRESSIVE DISORDER), RECURRENT EPISODE, MODERATE: ICD-10-CM

## 2023-10-03 DIAGNOSIS — G47.00 INSOMNIA, UNSPECIFIED TYPE: ICD-10-CM

## 2023-10-03 RX ORDER — TRAZODONE HYDROCHLORIDE 100 MG/1
150-200 TABLET ORAL NIGHTLY PRN
Qty: 60 TABLET | Refills: 2 | Status: SHIPPED | OUTPATIENT
Start: 2023-10-03

## 2023-10-03 NOTE — TELEPHONE ENCOUNTER
Pt called stating that 100mg of Trazadone is not really helping with sleep. Wants to try and get this increased please.   [phone]

## 2023-10-03 NOTE — TELEPHONE ENCOUNTER
Okay to increase trazodone to 1.5 to 2 tablets by mouth at bedtime as needed for sleep, new prescription sent to pharmacy, please let patient know for me.

## 2023-10-03 NOTE — TELEPHONE ENCOUNTER
Notified patient of new instructions and refill of medication. Patient verbalized an understanding.

## 2023-10-22 DIAGNOSIS — F41.1 GENERALIZED ANXIETY DISORDER WITH PANIC ATTACKS: ICD-10-CM

## 2023-10-22 DIAGNOSIS — F41.0 GENERALIZED ANXIETY DISORDER WITH PANIC ATTACKS: ICD-10-CM

## 2023-10-22 DIAGNOSIS — F60.3 BORDERLINE PERSONALITY DISORDER: ICD-10-CM

## 2023-10-22 DIAGNOSIS — F33.1 MDD (MAJOR DEPRESSIVE DISORDER), RECURRENT EPISODE, MODERATE: ICD-10-CM

## 2023-10-22 DIAGNOSIS — G47.00 INSOMNIA, UNSPECIFIED TYPE: ICD-10-CM

## 2023-10-22 RX ORDER — LAMOTRIGINE 100 MG/1
100 TABLET ORAL DAILY
Qty: 30 TABLET | Refills: 1 | Status: SHIPPED | OUTPATIENT
Start: 2023-10-22 | End: 2023-10-22 | Stop reason: SDUPTHER

## 2023-10-22 RX ORDER — TRAZODONE HYDROCHLORIDE 100 MG/1
150-200 TABLET ORAL NIGHTLY PRN
Qty: 60 TABLET | Refills: 2 | Status: SHIPPED | OUTPATIENT
Start: 2023-10-22

## 2023-10-22 RX ORDER — LAMOTRIGINE 100 MG/1
100 TABLET ORAL DAILY
Qty: 30 TABLET | Refills: 0 | Status: SHIPPED | OUTPATIENT
Start: 2023-10-22

## 2023-10-22 RX ORDER — BUSPIRONE HYDROCHLORIDE 10 MG/1
20 TABLET ORAL 2 TIMES DAILY
Qty: 120 TABLET | Refills: 0 | Status: SHIPPED | OUTPATIENT
Start: 2023-10-22

## 2023-12-13 DIAGNOSIS — F41.0 GENERALIZED ANXIETY DISORDER WITH PANIC ATTACKS: ICD-10-CM

## 2023-12-13 DIAGNOSIS — F60.3 BORDERLINE PERSONALITY DISORDER: ICD-10-CM

## 2023-12-13 DIAGNOSIS — F41.1 GENERALIZED ANXIETY DISORDER WITH PANIC ATTACKS: ICD-10-CM

## 2023-12-13 DIAGNOSIS — G47.00 INSOMNIA, UNSPECIFIED TYPE: ICD-10-CM

## 2023-12-13 DIAGNOSIS — F33.1 MDD (MAJOR DEPRESSIVE DISORDER), RECURRENT EPISODE, MODERATE: ICD-10-CM

## 2023-12-13 RX ORDER — BUSPIRONE HYDROCHLORIDE 10 MG/1
20 TABLET ORAL 2 TIMES DAILY
Qty: 120 TABLET | Refills: 2 | Status: SHIPPED | OUTPATIENT
Start: 2023-12-13

## 2023-12-19 DIAGNOSIS — F33.1 MDD (MAJOR DEPRESSIVE DISORDER), RECURRENT EPISODE, MODERATE: ICD-10-CM

## 2023-12-19 DIAGNOSIS — F60.3 BORDERLINE PERSONALITY DISORDER: ICD-10-CM

## 2023-12-19 DIAGNOSIS — F41.1 GENERALIZED ANXIETY DISORDER WITH PANIC ATTACKS: ICD-10-CM

## 2023-12-19 DIAGNOSIS — G47.00 INSOMNIA, UNSPECIFIED TYPE: ICD-10-CM

## 2023-12-19 DIAGNOSIS — F41.0 GENERALIZED ANXIETY DISORDER WITH PANIC ATTACKS: ICD-10-CM

## 2023-12-19 RX ORDER — HYDROCODONE BITARTRATE AND ACETAMINOPHEN 5; 325 MG/1; MG/1
TABLET ORAL
COMMUNITY
Start: 2023-11-10

## 2023-12-19 RX ORDER — COVID-19 MOLECULAR TEST ASSAY
KIT MISCELLANEOUS
COMMUNITY
Start: 2023-11-01

## 2023-12-19 RX ORDER — LAMOTRIGINE 100 MG/1
100 TABLET ORAL DAILY
Qty: 30 TABLET | Refills: 2 | Status: SHIPPED | OUTPATIENT
Start: 2023-12-19

## 2023-12-22 DIAGNOSIS — G47.00 INSOMNIA, UNSPECIFIED TYPE: ICD-10-CM

## 2023-12-22 DIAGNOSIS — F33.1 MDD (MAJOR DEPRESSIVE DISORDER), RECURRENT EPISODE, MODERATE: ICD-10-CM

## 2023-12-22 RX ORDER — TRAZODONE HYDROCHLORIDE 100 MG/1
TABLET ORAL
Qty: 60 TABLET | Refills: 2 | Status: SHIPPED | OUTPATIENT
Start: 2023-12-22

## 2023-12-26 NOTE — PROGRESS NOTES
Patient assessed today via MyChart through EP[employer] pt is at home in a secure environment and verbalizes privacy during interview. AJ Alva is at home in a secure environment using a secure laptop.The patient's condition being diagnosed/treated is appropriate for telemedicine.The provider identified himself as well as his credentials.The patient, and/or patient's guardian, consent to be seen remotely, and when consent is given they understand that the consent allows for patient identifiable information to be sent to a third party as needed. They may refuse to be seen remotely at any time. The electronic data is encrypted and password protected, and the patient and/or guardian has been advised of the potential risks to privacy not withstanding such measures.    Subjective    PATIENT ID: Liliana Gonzalez, :1990, MRN: [id number]    Chief Complaint   Patient presents with    Anxiety    Depression    Insomnia     HPI:   33 y.o. adult pt presents to [facility] 2023.  Patient seen last roughly 3 month's ago.  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    SUBSTANCE/SEXUAL HISTORY:  Social History     Socioeconomic History     Marital status: Single    Number of children: 0    Highest education level: 9th grade   Tobacco Use    Smoking status: Never     Passive exposure: Yes    Smokeless tobacco: Never    Tobacco comments:     every few years i pick smoking back up, presently vaping, no cigarettes.   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     FAMILY HISTORY:  family history includes ADD / ADHD in Liliana Gonzalez's father; Alcohol abuse in Liliana Gonzalez's father, maternal grandfather, maternal grandmother, and mother; Anxiety disorder in Liliana Gonzalez's maternal grandmother and mother; Depression in Liliana Gonzalez's father and mother; Drug abuse in Liliana Gonzalez's father and mother; Learning disabilities in Liliana Gonzalez's father and mother; OCD in Liliana Gonzalez's maternal grandmother and mother; Schizophrenia in Liliana Gonzalez's mother; Self-Injurious Behavior  in Liliana Gonzalez's sister; Suicide Attempts in Liliana Gonzalez's sister.     PAST MEDICAL HISTORY   has a past medical history of ETOH abuse.    Liliana Gonzalez has no past medical history of Disease of thyroid gland, Liver disease, Renal insufficiency, or Suicide attempt.     Review of Systems   Constitutional: Negative.    Respiratory:  Negative for chest tightness and shortness of breath.    Cardiovascular:  Negative for chest pain.   Gastrointestinal:  Negative for nausea and vomiting.   Neurological:  Negative for  dizziness and light-headedness.   Psychiatric/Behavioral:  Positive for sleep disturbance and depressed mood. Negative for suicidal ideas. The patient is nervous/anxious.      Objective   There were no vitals taken for this visit.     Physical Exam  Constitutional:       Appearance: Normal appearance.   HENT:      Head: Normocephalic.   Pulmonary:      Effort: Pulmonary effort is normal.   Skin:     General: Skin is dry.   Neurological:      Mental Status: He/She/They are alert and oriented to person, place, and time.     MENTAL STATUS EXAM   General Appearance:  Cleanly groomed and dressed  Eye Contact:  Good eye contact  Attitude:  Cooperative  Motor Activity:  Normal posture  Speech:  Normal rate, tone, volume  Mood and affect:  Normal, pleasant  Thought Process:  Goal-directed  Associations/ Thought Content:  No delusions  Hallucinations:  None  Suicidal Ideations:  Not present  Homicidal Ideation:  Not present  Sensorium:  Alert  Orientation:  Person, place, time and situation  Attention Span/ Concentration:  Good  Fund of Knowledge:  Appropriate for age and educational level  Intellectual Functioning:  Average range  Insight:  Fair  Judgement:  Fair  Reliability:  Fair    PHQ-9 Depression Screening  Little interest or pleasure in doing things? (P) 3-->nearly every day   Feeling down, depressed, or hopeless? (P) 3-->nearly every day   Trouble falling or staying asleep, or sleeping too much? (P) 2-->more than half the days   Feeling tired or having little energy?     Poor appetite or overeating? (P) 3-->nearly every day   Feeling bad about yourself/you are a failure/have let yourself or your family down? (P) 3-->nearly every day   Trouble concentrating on things? (P) 3-->nearly every day   Psychomotor agitation/retardation (P) 0-->not at all   Thoughts about death/dying/suicide (P) 0-->not at all   PHQ-9 Total Score (P) 20   How difficult have these problems for you? (P) somewhat difficult     GAD7  Documentation:  Feeling nervous, anxious or on edge (P) 3   Not being able to stop or control worrying (P) 3   Worrying too much about different things (P) 3   Trouble relaxing (P) 3   Being so restless that it is hard to sit still (P) 3   Becoming easily annoyed or irritable (P) 1   Feeling Afraid as if something awful might happen (P) 0   SELAM Total Score (P) 16   How difficult have these problems made it for you? (P) Very difficult     LABS:  No visits with results within 1 Month(s) from this visit.   Latest known visit with results is:   No results found for any previous visit.      Allergies   Allergen Reactions    Rice GI Intolerance     Swollen joints      Current Outpatient Medications   Medication Instructions    BinaxNOW COVID-19 Ag Home Test kit TEST AS DIRECTED TODAY    buPROPion XL (WELLBUTRIN XL) 150 mg, Oral, Every Morning    busPIRone (BUSPAR) 20 mg, Oral, 2 Times Daily    HYDROcodone-acetaminophen (NORCO) 5-325 MG per tablet     lamoTRIgine (LAMICTAL) 100 mg, Oral, Daily    Testosterone Cypionate (DEPOTESTOTERONE CYPIONATE) 200 MG/ML injection No dose, route, or frequency recorded.    traZODone (DESYREL) 100 MG tablet TAKE 1 1/2 TO 2 TABLETS BY MOUTH AT NIGHT AS NEEDED FOR SLEEP      Assessment    ASSESSMENT/TREATMENT PLAN/INSTRUCTIONS/EDUCATION:     (F33.1) MDD (major depressive disorder), recurrent episode, moderate - Plan: buPROPion XL (Wellbutrin XL) 150 MG 24 hr tablet    (F41.1,  F41.0) Generalized anxiety disorder with panic attacks - Plan: buPROPion XL (Wellbutrin XL) 150 MG 24 hr tablet    (F60.3) Borderline personality disorder - Plan: buPROPion XL (Wellbutrin XL) 150 MG 24 hr tablet    (F51.05,  F99) Insomnia due to other mental disorder     -Depression worsening, agreed to start Wellbutrin  mg take in the morning.  Continue lamotrigine and BuSpar as previously ordered,.  -Sleep, appears somewhat stable agreed to continue trazodone as previously ordered.  -Continue counseling weekly  with established therapist, ask about EMDR, Maria Pizarro  -Increase positive coping skills discussed today, hiking, arts and crafts, hobbies, travel  -Agreed to follow-up in 6 weeks    -Please call the office at [phone] with any worsening of symptoms or onset of intolerable side effects, please ask to leave a message with Artie's medical assistant.  Please give my office up to 48 hours to respond to a patient call/question/refill request.  -Patient is agreeable to call 911 or go to the nearest ER should he/she/they have any thoughts of harm to self or others.  -Patient has been educated on providers schedule, contact information, and patient/provider expectations.     FOLLOW UP: Return in about 6 weeks (around 2/9/2024) for Video visit.    PSYCHOTHERAPY:  In/Start Time: 1000.  Out/Stop Time: 1016.  16 minutes of face to face direct patient care with patient was spent for supportive psychotherapy including strengthening self awareness and insights, strengthening coping skills, counseling the patient regarding diagnoses, and utilizing cognitive behavioral therapy to assist the patient in recognizing more appropriate coping mechanisms which are proven effective in reducing the severity of frequency of symptoms.  This APRN assisted the patient in processing the patient's diagnoses, and also acknowledged and normalized the patient's thoughts, feelings, and concerns This APRN allowed the patient to freely discuss issues without interruption or judgment.      MEDICATION ISSUES:  DEBI: Reviewed during F/U appt's via interface.    There are no discontinued medications.  New Medications Ordered This Visit   Medications    buPROPion XL (Wellbutrin XL) 150 MG 24 hr tablet     Sig: Take 1 tablet by mouth Every Morning.     Dispense:  30 tablet     Refill:  0      No orders of the defined types were placed in this encounter.    -Barriers: multiple psychiatric comorbidities , stress, and personality disorder   -Strengths:   motivated     -Progress toward goal:  Not at goal  -Functional Status: Moderate impairment   -Prognosis: Fair with Ongoing Treatment        SUMMARY/DISCUSSION:  Pt past social/medical/family history reviewed/updated. ROS conducted, medications/allergies, reviewed.  Most recent vitals/labs reviewed. Pt was given appropriate time to ask questions and concerns were addressed. A thorough discussion was had that included review of disease process, need for continued monitoring and additional treatment options including use of pharmacological and non-pharmacological approaches to care, decisions were made and agreed upon by patient and provider. Discussed the risks, benefits, and potential side effects of the medications; patient ackowledged and verbally consented.     Part of this note may be an electronic transcription/translation of spoken language to printed text using the Dragon Dictation System. Some of the data in this electronic note has been brought forward from a previous encounter, any necessary changes have been made, it has been reviewed by this APRN, and it is accurate.    This document has been electronically signed by MIKKI Goins December 29, 2023 10:51 EST

## 2023-12-29 ENCOUNTER — TELEMEDICINE (OUTPATIENT)
Dept: BEHAVIORAL HEALTH | Facility: CLINIC | Age: 33
End: 2023-12-29
Payer: COMMERCIAL

## 2023-12-29 DIAGNOSIS — F51.05 INSOMNIA DUE TO OTHER MENTAL DISORDER: ICD-10-CM

## 2023-12-29 DIAGNOSIS — F41.0 GENERALIZED ANXIETY DISORDER WITH PANIC ATTACKS: ICD-10-CM

## 2023-12-29 DIAGNOSIS — F41.1 GENERALIZED ANXIETY DISORDER WITH PANIC ATTACKS: ICD-10-CM

## 2023-12-29 DIAGNOSIS — F99 INSOMNIA DUE TO OTHER MENTAL DISORDER: ICD-10-CM

## 2023-12-29 DIAGNOSIS — F60.3 BORDERLINE PERSONALITY DISORDER: ICD-10-CM

## 2023-12-29 DIAGNOSIS — F33.1 MDD (MAJOR DEPRESSIVE DISORDER), RECURRENT EPISODE, MODERATE: Primary | ICD-10-CM

## 2023-12-29 RX ORDER — BUPROPION HYDROCHLORIDE 150 MG/1
150 TABLET ORAL EVERY MORNING
Qty: 30 TABLET | Refills: 0 | Status: SHIPPED | OUTPATIENT
Start: 2023-12-29

## 2023-12-29 NOTE — PATIENT INSTRUCTIONS
GENERAL NEW PATIENT INSTRUCTIONS:    -Please arrive in person or virtually 10-15 minutes prior to appointment to allow for registration/sign in/questionnaires. If you are seen virtually please review after visit summary (AVS)/patient instructions via [employer] for a summary of plan of care/changes in treatment plan. If you are having difficulties logging on or accessing [employer] please contact my office for assistance.    -The best way to get a hold of Artie is to call the office at [phone] and ask to leave a message with his medical assistant. Artie Allison is a Psychiatric Mental Health Nurse Practitioner, due to his specialty patient's are not able to message him directly via [employer]. Please give my office up to 48 hours to respond to a patient call/question/refill request. Refill requests will be made during normal office hours only, Monday-Friday 8:00-5:30.      -Artie is out of the office on Wednesdays and weekends. Tuesdays and Thursdays are Artie's in-office days, Mondays and Fridays are his telehealth days.  The decision to be seen virtually or in person is up to the discretion of the provider, not all behavioral health problems are appropriate for telehealth.    -Please call the office at [phone] with any worsening of symptoms or onset of intolerable side effects.  -Follow-up appointments must be maintained in order for prescribing to continue.  -Patient has been educated regarding multimodal approach with healthy nutrition, healthy sleep, regular physical activity, social activities, counseling, and medications.   -Please call 911 or go to the nearest ER if you begin to have thoughts of harming yourself or other people.

## 2024-01-23 DIAGNOSIS — F33.1 MDD (MAJOR DEPRESSIVE DISORDER), RECURRENT EPISODE, MODERATE: ICD-10-CM

## 2024-01-23 DIAGNOSIS — F41.0 GENERALIZED ANXIETY DISORDER WITH PANIC ATTACKS: ICD-10-CM

## 2024-01-23 DIAGNOSIS — F41.1 GENERALIZED ANXIETY DISORDER WITH PANIC ATTACKS: ICD-10-CM

## 2024-01-23 DIAGNOSIS — F60.3 BORDERLINE PERSONALITY DISORDER: ICD-10-CM

## 2024-01-24 RX ORDER — BUPROPION HYDROCHLORIDE 150 MG/1
150 TABLET ORAL EVERY MORNING
Qty: 30 TABLET | Refills: 0 | Status: SHIPPED | OUTPATIENT
Start: 2024-01-24

## 2024-02-16 ENCOUNTER — TELEMEDICINE (OUTPATIENT)
Dept: BEHAVIORAL HEALTH | Facility: CLINIC | Age: 34
End: 2024-02-16
Payer: COMMERCIAL

## 2024-02-16 DIAGNOSIS — F51.05 INSOMNIA DUE TO OTHER MENTAL DISORDER: ICD-10-CM

## 2024-02-16 DIAGNOSIS — F60.3 BORDERLINE PERSONALITY DISORDER: ICD-10-CM

## 2024-02-16 DIAGNOSIS — F41.1 GENERALIZED ANXIETY DISORDER WITH PANIC ATTACKS: ICD-10-CM

## 2024-02-16 DIAGNOSIS — F41.0 GENERALIZED ANXIETY DISORDER WITH PANIC ATTACKS: ICD-10-CM

## 2024-02-16 DIAGNOSIS — F33.1 MDD (MAJOR DEPRESSIVE DISORDER), RECURRENT EPISODE, MODERATE: Primary | ICD-10-CM

## 2024-02-16 DIAGNOSIS — F99 INSOMNIA DUE TO OTHER MENTAL DISORDER: ICD-10-CM

## 2024-02-23 DIAGNOSIS — F41.1 GENERALIZED ANXIETY DISORDER WITH PANIC ATTACKS: ICD-10-CM

## 2024-02-23 DIAGNOSIS — F33.1 MDD (MAJOR DEPRESSIVE DISORDER), RECURRENT EPISODE, MODERATE: ICD-10-CM

## 2024-02-23 DIAGNOSIS — F60.3 BORDERLINE PERSONALITY DISORDER: ICD-10-CM

## 2024-02-23 DIAGNOSIS — F41.0 GENERALIZED ANXIETY DISORDER WITH PANIC ATTACKS: ICD-10-CM

## 2024-02-23 RX ORDER — BUPROPION HYDROCHLORIDE 150 MG/1
150 TABLET ORAL EVERY MORNING
Qty: 90 TABLET | Refills: 0 | Status: SHIPPED | OUTPATIENT
Start: 2024-02-23

## 2024-03-28 DIAGNOSIS — F60.3 BORDERLINE PERSONALITY DISORDER: ICD-10-CM

## 2024-03-28 DIAGNOSIS — F41.1 GENERALIZED ANXIETY DISORDER WITH PANIC ATTACKS: ICD-10-CM

## 2024-03-28 DIAGNOSIS — F33.1 MDD (MAJOR DEPRESSIVE DISORDER), RECURRENT EPISODE, MODERATE: ICD-10-CM

## 2024-03-28 DIAGNOSIS — G47.00 INSOMNIA, UNSPECIFIED TYPE: ICD-10-CM

## 2024-03-28 DIAGNOSIS — F41.0 GENERALIZED ANXIETY DISORDER WITH PANIC ATTACKS: ICD-10-CM

## 2024-03-28 NOTE — TELEPHONE ENCOUNTER
Pended 90 day supply for medications to preferred pharmacy. Routing to Dr. Newman who is covering Rafal Allison's refills.    LAST VISIT - 02/16/24  NEXT VISIT - 05/16/24

## 2024-03-28 NOTE — TELEPHONE ENCOUNTER
Patient called requesting a 90 day supply of Welllbutrin XL, 150mg, Buspar 10mgs, lamictal 100mgs, and trazodone 100mgs sent to [facility] [phone]

## 2024-03-29 RX ORDER — LAMOTRIGINE 100 MG/1
100 TABLET ORAL DAILY
Qty: 30 TABLET | Refills: 2 | Status: SHIPPED | OUTPATIENT
Start: 2024-03-29 | End: 2024-03-31 | Stop reason: SDUPTHER

## 2024-03-29 RX ORDER — BUPROPION HYDROCHLORIDE 150 MG/1
150 TABLET ORAL EVERY MORNING
Qty: 90 TABLET | Refills: 0 | Status: SHIPPED | OUTPATIENT
Start: 2024-03-29

## 2024-03-29 RX ORDER — BUSPIRONE HYDROCHLORIDE 10 MG/1
20 TABLET ORAL 2 TIMES DAILY
Qty: 120 TABLET | Refills: 2 | Status: SHIPPED | OUTPATIENT
Start: 2024-03-29 | End: 2024-03-31 | Stop reason: SDUPTHER

## 2024-03-29 RX ORDER — TRAZODONE HYDROCHLORIDE 100 MG/1
TABLET ORAL
Qty: 60 TABLET | Refills: 2 | Status: SHIPPED | OUTPATIENT
Start: 2024-03-29 | End: 2024-03-31 | Stop reason: SDUPTHER

## 2024-03-31 DIAGNOSIS — F41.0 GENERALIZED ANXIETY DISORDER WITH PANIC ATTACKS: ICD-10-CM

## 2024-03-31 DIAGNOSIS — F60.3 BORDERLINE PERSONALITY DISORDER: ICD-10-CM

## 2024-03-31 DIAGNOSIS — F33.1 MDD (MAJOR DEPRESSIVE DISORDER), RECURRENT EPISODE, MODERATE: ICD-10-CM

## 2024-03-31 DIAGNOSIS — G47.00 INSOMNIA, UNSPECIFIED TYPE: ICD-10-CM

## 2024-03-31 DIAGNOSIS — F41.1 GENERALIZED ANXIETY DISORDER WITH PANIC ATTACKS: ICD-10-CM

## 2024-04-01 ENCOUNTER — TELEPHONE (OUTPATIENT)
Dept: FAMILY MEDICINE CLINIC | Facility: CLINIC | Age: 34
End: 2024-04-01
Payer: COMMERCIAL

## 2024-04-01 DIAGNOSIS — F60.3 BORDERLINE PERSONALITY DISORDER: ICD-10-CM

## 2024-04-01 DIAGNOSIS — F41.1 GENERALIZED ANXIETY DISORDER WITH PANIC ATTACKS: ICD-10-CM

## 2024-04-01 DIAGNOSIS — G47.00 INSOMNIA, UNSPECIFIED TYPE: ICD-10-CM

## 2024-04-01 DIAGNOSIS — F33.1 MDD (MAJOR DEPRESSIVE DISORDER), RECURRENT EPISODE, MODERATE: Primary | ICD-10-CM

## 2024-04-01 DIAGNOSIS — F41.0 GENERALIZED ANXIETY DISORDER WITH PANIC ATTACKS: ICD-10-CM

## 2024-04-01 RX ORDER — TRAZODONE HYDROCHLORIDE 100 MG/1
TABLET ORAL
Qty: 60 TABLET | Refills: 0 | Status: SHIPPED | OUTPATIENT
Start: 2024-04-01 | End: 2024-04-02 | Stop reason: SDUPTHER

## 2024-04-01 RX ORDER — LAMOTRIGINE 100 MG/1
100 TABLET ORAL DAILY
Qty: 30 TABLET | Refills: 0 | Status: SHIPPED | OUTPATIENT
Start: 2024-04-01 | End: 2024-04-02 | Stop reason: SDUPTHER

## 2024-04-01 RX ORDER — BUSPIRONE HYDROCHLORIDE 10 MG/1
20 TABLET ORAL 2 TIMES DAILY
Qty: 120 TABLET | Refills: 0 | Status: SHIPPED | OUTPATIENT
Start: 2024-04-01 | End: 2024-04-02 | Stop reason: SDUPTHER

## 2024-04-01 NOTE — TELEPHONE ENCOUNTER
Meds that were refilled todaym all 4 of them needed to be 90 day scripts. Pharmacy is [facility].

## 2024-04-02 RX ORDER — LAMOTRIGINE 100 MG/1
100 TABLET ORAL DAILY
Qty: 90 TABLET | Refills: 0 | Status: SHIPPED | OUTPATIENT
Start: 2024-04-02

## 2024-04-02 RX ORDER — BUPROPION HYDROCHLORIDE 150 MG/1
150 TABLET ORAL EVERY MORNING
Qty: 90 TABLET | Refills: 0 | Status: SHIPPED | OUTPATIENT
Start: 2024-04-02

## 2024-04-02 RX ORDER — TRAZODONE HYDROCHLORIDE 100 MG/1
TABLET ORAL
Qty: 240 TABLET | Refills: 0 | Status: SHIPPED | OUTPATIENT
Start: 2024-04-02

## 2024-04-02 RX ORDER — BUSPIRONE HYDROCHLORIDE 10 MG/1
20 TABLET ORAL 2 TIMES DAILY
Qty: 360 TABLET | Refills: 0 | Status: SHIPPED | OUTPATIENT
Start: 2024-04-02

## 2024-05-16 ENCOUNTER — TELEMEDICINE (OUTPATIENT)
Dept: BEHAVIORAL HEALTH | Facility: CLINIC | Age: 34
End: 2024-05-16
Payer: COMMERCIAL

## 2024-05-16 DIAGNOSIS — F60.3 BORDERLINE PERSONALITY DISORDER: ICD-10-CM

## 2024-05-16 DIAGNOSIS — F33.1 MDD (MAJOR DEPRESSIVE DISORDER), RECURRENT EPISODE, MODERATE: Primary | ICD-10-CM

## 2024-05-16 DIAGNOSIS — F41.0 GENERALIZED ANXIETY DISORDER WITH PANIC ATTACKS: ICD-10-CM

## 2024-05-16 DIAGNOSIS — F41.1 GENERALIZED ANXIETY DISORDER WITH PANIC ATTACKS: ICD-10-CM

## 2024-05-16 DIAGNOSIS — G47.00 INSOMNIA, UNSPECIFIED TYPE: ICD-10-CM

## 2024-05-16 NOTE — PATIENT INSTRUCTIONS
GENERAL NEW PATIENT INSTRUCTIONS:    -Please arrive in person or virtually 10-15 minutes prior to appointment to allow for registration/sign in/questionnaires. If you are seen virtually please review after visit summary (AVS)/patient instructions via [employer] for a summary of plan of care/changes in treatment plan. If you are having difficulties logging on or accessing [employer] please contact my office for assistance.    -The best way to get a hold of Artie is to call the office at [phone] and ask to leave a message with his medical assistant. Artie Allison is a Psychiatric Mental Health Nurse Practitioner, due to his specialty patient's are not able to message him directly via [employer]. Please give my office up to 48 hours to respond to a patient call/question/refill request. Refill requests will be made during normal office hours only, Monday-Friday 8:00-5:30.      -Artie is out of the office on Fridays and weekends. Tuesdays and Thursdays are Artie's in-office days, Mondays and Wednesdays are his telehealth days.  The decision to be seen virtually or in person is up to the discretion of the provider, not all behavioral health problems are appropriate for telehealth.    -Please call the office at [phone] with any worsening of symptoms or onset of intolerable side effects.  -Follow-up appointments must be maintained in order for prescribing to continue.  -Patient has been educated regarding multimodal approach with healthy nutrition, healthy sleep, regular physical activity, social activities, counseling, and medications.   -Please call 911 or go to the nearest ER if you begin to have thoughts of harming yourself or other people.

## 2024-05-16 NOTE — PROGRESS NOTES
Patient assessed today via MyChart through EPIC pt is at home in a secure environment and verbalizes privacy during interview. AJ Alva is at home in a secure environment using a secure laptop.The patient's condition being diagnosed/treated is appropriate for telemedicine.The provider identified himself as well as his credentials.The patient, and/or patient's guardian, consent to be seen remotely, and when consent is given they understand that the consent allows for patient identifiable information to be sent to a third party as needed. They may refuse to be seen remotely at any time. The electronic data is encrypted and password protected, and the patient and/or guardian has been advised of the potential risks to privacy not withstanding such measures.    Subjective      Chief Complaint   Patient presents with    Depression    Anxiety    Insomnia     Pronouns: they/them/theirs  Sex assigned at birth: female  Gender identity: other    HPI:  Liliana Gonzalez, 33 y.o. pt presents to [facility] on 05/16/2024 for F/U, pt seen today for psychiatric med management of Depression, Anxiety, and Insomnia . Pt last seen roughly 3 months prior, no medication changes at that time, since then patient reports they visited parents in [place] X1 month reports trip was amazing, patient continues to live on parents farm on/off works at a local [employer], continues to see therapist, reports overall medication is helping and they feel much more stable, does continue to have issues with agitation/reactivity when dealing with others but is improving overall.    PHQ9: (P) 9, score of GAD7: (P) 6    Social History     Socioeconomic History    Marital status: Single    Number of children: 0    Highest education level: 9th grade   Tobacco Use    Smoking status: Never     Passive exposure: Yes    Smokeless tobacco: Never    Tobacco comments:     every few years i pick smoking back up, presently vaping, no cigarettes.   Vaping  Use    Vaping status: Every Day    Substances: Nicotine    Devices: Disposable, Pre-filled or refillable cartridge   Substance and Sexual Activity    Alcohol use: Never     Comment: Sober since March 2019    Drug use: Not Currently    Sexual activity: Not Currently     Partners: Female, Male     Birth control/protection: Condom, I.U.D., Bilateral salpingectomy      Social History     Social History Narrative    Patient lives alone with her dog, works at the [employer], denies having any hobbies, highest level of education is ninth grade, reports a history of special education, patient denies having any biological children.     PAST MEDICAL HISTORY:   has a past medical history of ETOH abuse.    Liliana has no past medical history of Disease of thyroid gland, Liver disease, Renal insufficiency, or Suicide attempt.     Review of Systems   Constitutional: Negative.  Negative for chills and fever.   Respiratory:  Negative for chest tightness and shortness of breath.    Cardiovascular:  Negative for chest pain.   Gastrointestinal:  Negative for nausea and vomiting.   Neurological:  Negative for dizziness and light-headedness.   Psychiatric/Behavioral:  Positive for sleep disturbance and stress. Negative for suicidal ideas.      Objective   There were no vitals taken for this visit.     Physical Exam  Constitutional:       Appearance: Normal appearance.   HENT:      Head: Normocephalic.   Pulmonary:      Effort: Pulmonary effort is normal.   Skin:     General: Skin is dry.   Neurological:      Mental Status: He/She/They are alert and oriented to person, place, and time.     MENTAL STATUS EXAM   General Appearance:  Cleanly groomed and dressed  Eye Contact:  Good eye contact  Attitude:  Cooperative  Motor Activity:  Normal posture  Speech:  Normal rate, tone, volume  Mood and affect:  Normal, pleasant  Thought Process:  Goal-directed  Associations/ Thought Content:  No delusions  Hallucinations:  None  Suicidal  Ideations:  Not present  Homicidal Ideation:  Not present  Sensorium:  Alert  Orientation:  Person, place, time and situation  Attention Span/ Concentration:  Good  Fund of Knowledge:  Appropriate for age and educational level  Intellectual Functioning:  Average range  Insight:  Fair  Judgement:  Fair  Reliability:  Fair    PHQ-9 Depression Screening  Little interest or pleasure in doing things? (P) 1-->several days   Feeling down, depressed, or hopeless? (P) 0-->not at all   Trouble falling or staying asleep, or sleeping too much? (P) 3-->nearly every day   Feeling tired or having little energy?     Poor appetite or overeating? (P) 0-->not at all   Feeling bad about yourself/you are a failure/have let yourself or your family down? (P) 1-->several days   Trouble concentrating on things? (P) 3-->nearly every day   Psychomotor agitation/retardation (P) 0-->not at all   Thoughts about death/dying/suicide (P) 0-->not at all   PHQ-9 Total Score (P) 9   How difficult have these problems for you? (P) somewhat difficult     GAD7 Documentation:  Feeling nervous, anxious or on edge (P) 3   Not being able to stop or control worrying (P) 0   Worrying too much about different things (P) 1   Trouble relaxing (P) 1   Being so restless that it is hard to sit still (P) 0   Becoming easily annoyed or irritable (P) 1   Feeling Afraid as if something awful might happen (P) 0   SELAM Total Score (P) 6   How difficult have these problems made it for you? (P) Somewhat difficult     LABS:  No visits with results within 1 Month(s) from this visit.   Latest known visit with results is:   No results found for any previous visit.      Allergies   Allergen Reactions    Rice GI Intolerance     Swollen joints      Current Outpatient Medications   Medication Instructions    buPROPion XL (WELLBUTRIN XL) 150 mg, Oral, Every Morning    busPIRone (BUSPAR) 20 mg, Oral, 2 Times Daily    lamoTRIgine (LAMICTAL) 100 mg, Oral, Daily    Testosterone Cypionate  (DEPOTESTOTERONE CYPIONATE) 200 MG/ML injection No dose, route, or frequency recorded.    traZODone (DESYREL) 100 MG tablet TAKE 1.5 TO 2 TABLETS BY MOUTH AT NIGHT AS NEEDED FOR SLEEP      Assessment    ASSESSMENT/TREATMENT PLAN/INSTRUCTIONS/EDUCATION:     (F33.1) MDD (major depressive disorder), recurrent episode, moderate - stable - Plan: continue lamictal/wellbutrin/buspar/trazodone, continue with therapist prn  -     (F41.1,  F41.0) Generalized anxiety disorder with panic attacks - stable - Plan: continue lamictal/wellbutrin/buspar/trazodone, continue with therapist prn -     (G47.00) Insomnia, unspecified type - stable -    (F60.3) Borderline personality traits -stable -    FOLLOW UP: Return in about 6 months (around 11/16/2024) for Recheck.    PSYCHOTHERAPY:  In/Start Time: 1130.  Out/Stop Time: 1146.  16 minutes of face to face direct patient care with patient was spent for supportive psychotherapy including strengthening self awareness and insights, strengthening coping skills, counseling the patient regarding diagnoses, and utilizing cognitive behavioral therapy to assist the patient in recognizing more appropriate coping mechanisms which are proven effective in reducing the severity of frequency of symptoms.  This APRN assisted the patient in processing the patient's diagnoses, and also acknowledged and normalized the patient's thoughts, feelings, and concerns This APRN allowed the patient to freely discuss issues without interruption or judgment.      MEDICATION ISSUES:  DEBI: Reviewed during F/U appt's via interface.     There are no discontinued medications.          No orders of the defined types were placed in this encounter.    -Barriers: multiple psychiatric comorbidities , stress, and personality disorder   -Strengths:  motivated     -Short-Term Goals: Pt will be compliant with medication management and note improvement in S/S over the next 4 to 6 weeks or at next scheduled visit.  -Long-Term  Goals: Pt will be compliant with the agreed treatment plan including medication regimen & F/U appt's and deny impairment in daily functioning over the next 6 months.      -Progress toward goal:  at goal  -Functional Status: Moderate impairment   -Prognosis: Fair with Ongoing Treatment        SUMMARY/DISCUSSION:  Pt past social/medical/family history reviewed/updated. ROS conducted, medications/allergies, reviewed.  Most recent vitals/labs reviewed. Pt was given appropriate time to ask questions and concerns were addressed. A thorough discussion was had that included review of disease process, need for continued monitoring and additional treatment options including use of pharmacological and non-pharmacological approaches to care, decisions were made and agreed upon by patient and provider. Discussed the risks, benefits, and potential side effects of the medications; patient ackowledged and verbally consented.     -Please call the office at [phone] with any worsening of symptoms or onset of intolerable side effects, please ask to leave a message with Artie's medical assistant.  Please give my office up to 48 hours to respond to a patient call/question/refill request.  -Patient is agreeable to call 911 or go to the nearest ER should he/she/they have any thoughts of harm to self or others.  -Patient has been educated on providers schedule, contact information, and patient/provider expectations.     Part of this note may be an electronic transcription/translation of spoken language to printed text using the Dragon Dictation System. Some of the data in this electronic note has been brought forward from a previous encounter, any necessary changes have been made, it has been reviewed by this APRN, and it is accurate.    This document has been electronically signed by MIKKI Goins May 16, 2024 13:32 EDT

## 2024-06-04 DIAGNOSIS — F60.3 BORDERLINE PERSONALITY DISORDER: ICD-10-CM

## 2024-06-04 DIAGNOSIS — F41.0 GENERALIZED ANXIETY DISORDER WITH PANIC ATTACKS: ICD-10-CM

## 2024-06-04 DIAGNOSIS — F41.1 GENERALIZED ANXIETY DISORDER WITH PANIC ATTACKS: ICD-10-CM

## 2024-06-04 DIAGNOSIS — F33.1 MDD (MAJOR DEPRESSIVE DISORDER), RECURRENT EPISODE, MODERATE: ICD-10-CM

## 2024-06-04 RX ORDER — BUPROPION HYDROCHLORIDE 150 MG/1
150 TABLET ORAL EVERY MORNING
Qty: 90 TABLET | Refills: 0 | Status: SHIPPED | OUTPATIENT
Start: 2024-06-04

## 2024-06-28 DIAGNOSIS — F60.3 BORDERLINE PERSONALITY DISORDER: ICD-10-CM

## 2024-06-28 DIAGNOSIS — F41.1 GENERALIZED ANXIETY DISORDER WITH PANIC ATTACKS: ICD-10-CM

## 2024-06-28 DIAGNOSIS — F33.1 MDD (MAJOR DEPRESSIVE DISORDER), RECURRENT EPISODE, MODERATE: ICD-10-CM

## 2024-06-28 DIAGNOSIS — G47.00 INSOMNIA, UNSPECIFIED TYPE: ICD-10-CM

## 2024-06-28 DIAGNOSIS — F41.0 GENERALIZED ANXIETY DISORDER WITH PANIC ATTACKS: ICD-10-CM

## 2024-07-01 RX ORDER — BUSPIRONE HYDROCHLORIDE 10 MG/1
20 TABLET ORAL 2 TIMES DAILY
Qty: 360 TABLET | Refills: 0 | Status: SHIPPED | OUTPATIENT
Start: 2024-07-01

## 2024-07-04 DIAGNOSIS — G47.00 INSOMNIA, UNSPECIFIED TYPE: ICD-10-CM

## 2024-07-04 DIAGNOSIS — F33.1 MDD (MAJOR DEPRESSIVE DISORDER), RECURRENT EPISODE, MODERATE: ICD-10-CM

## 2024-07-05 RX ORDER — TRAZODONE HYDROCHLORIDE 100 MG/1
TABLET ORAL
Qty: 240 TABLET | Refills: 0 | Status: SHIPPED | OUTPATIENT
Start: 2024-07-05

## 2024-08-01 DIAGNOSIS — F41.0 GENERALIZED ANXIETY DISORDER WITH PANIC ATTACKS: ICD-10-CM

## 2024-08-01 DIAGNOSIS — G47.00 INSOMNIA, UNSPECIFIED TYPE: ICD-10-CM

## 2024-08-01 DIAGNOSIS — F41.1 GENERALIZED ANXIETY DISORDER WITH PANIC ATTACKS: ICD-10-CM

## 2024-08-01 DIAGNOSIS — F60.3 BORDERLINE PERSONALITY DISORDER: ICD-10-CM

## 2024-08-01 DIAGNOSIS — F33.1 MDD (MAJOR DEPRESSIVE DISORDER), RECURRENT EPISODE, MODERATE: ICD-10-CM

## 2024-08-01 RX ORDER — LAMOTRIGINE 100 MG/1
100 TABLET ORAL DAILY
Qty: 90 TABLET | Refills: 0 | Status: SHIPPED | OUTPATIENT
Start: 2024-08-01

## 2024-08-02 ENCOUNTER — TELEPHONE (OUTPATIENT)
Dept: FAMILY MEDICINE CLINIC | Facility: CLINIC | Age: 34
End: 2024-08-02
Payer: COMMERCIAL

## 2024-08-02 NOTE — TELEPHONE ENCOUNTER
Pt had a script refilled for Lamictal 30 days, wanted 90 days but pharmacy told him it was too early.  Needs to speak to you regarding this.  [phone]

## 2024-08-02 NOTE — TELEPHONE ENCOUNTER
Called pharmacy to request prescription information. 30 day supply was processed, asked for pharmacy tech to check system again as Rafal had sent 90 day supply. She was able to confirm this is the case and put the 30 day supply back to process the 90 day supply. She states it will take her an additional 30 minutes or so to get this ready.    Returned patient's call. Informed patient, they verbalized an understanding.

## 2024-08-04 DIAGNOSIS — F60.3 BORDERLINE PERSONALITY DISORDER: ICD-10-CM

## 2024-08-04 DIAGNOSIS — F33.1 MDD (MAJOR DEPRESSIVE DISORDER), RECURRENT EPISODE, MODERATE: ICD-10-CM

## 2024-08-04 DIAGNOSIS — F41.1 GENERALIZED ANXIETY DISORDER WITH PANIC ATTACKS: ICD-10-CM

## 2024-08-04 DIAGNOSIS — G47.00 INSOMNIA, UNSPECIFIED TYPE: ICD-10-CM

## 2024-08-04 DIAGNOSIS — F41.0 GENERALIZED ANXIETY DISORDER WITH PANIC ATTACKS: ICD-10-CM

## 2024-08-04 RX ORDER — LAMOTRIGINE 100 MG/1
100 TABLET ORAL DAILY
Qty: 30 TABLET | OUTPATIENT
Start: 2024-08-04

## 2024-09-13 DIAGNOSIS — F41.1 GENERALIZED ANXIETY DISORDER WITH PANIC ATTACKS: ICD-10-CM

## 2024-09-13 DIAGNOSIS — F41.0 GENERALIZED ANXIETY DISORDER WITH PANIC ATTACKS: ICD-10-CM

## 2024-09-13 DIAGNOSIS — F60.3 BORDERLINE PERSONALITY DISORDER: ICD-10-CM

## 2024-09-13 DIAGNOSIS — F33.1 MDD (MAJOR DEPRESSIVE DISORDER), RECURRENT EPISODE, MODERATE: ICD-10-CM

## 2024-09-13 RX ORDER — BUPROPION HYDROCHLORIDE 150 MG/1
150 TABLET ORAL EVERY MORNING
Qty: 90 TABLET | Refills: 0 | Status: SHIPPED | OUTPATIENT
Start: 2024-09-13

## 2024-10-22 DIAGNOSIS — F33.1 MDD (MAJOR DEPRESSIVE DISORDER), RECURRENT EPISODE, MODERATE: ICD-10-CM

## 2024-10-22 DIAGNOSIS — G47.00 INSOMNIA, UNSPECIFIED TYPE: ICD-10-CM

## 2024-10-22 RX ORDER — TRAZODONE HYDROCHLORIDE 100 MG/1
TABLET ORAL
Qty: 240 TABLET | Refills: 0 | Status: SHIPPED | OUTPATIENT
Start: 2024-10-22

## 2024-11-10 DIAGNOSIS — G47.00 INSOMNIA, UNSPECIFIED TYPE: ICD-10-CM

## 2024-11-10 DIAGNOSIS — F41.0 GENERALIZED ANXIETY DISORDER WITH PANIC ATTACKS: ICD-10-CM

## 2024-11-10 DIAGNOSIS — F33.1 MDD (MAJOR DEPRESSIVE DISORDER), RECURRENT EPISODE, MODERATE: ICD-10-CM

## 2024-11-10 DIAGNOSIS — F41.1 GENERALIZED ANXIETY DISORDER WITH PANIC ATTACKS: ICD-10-CM

## 2024-11-10 DIAGNOSIS — F60.3 BORDERLINE PERSONALITY DISORDER: ICD-10-CM

## 2024-11-11 RX ORDER — LAMOTRIGINE 100 MG/1
100 TABLET ORAL DAILY
Qty: 90 TABLET | Refills: 0 | Status: SHIPPED | OUTPATIENT
Start: 2024-11-11

## 2024-11-11 RX ORDER — BUSPIRONE HYDROCHLORIDE 10 MG/1
20 TABLET ORAL 2 TIMES DAILY
Qty: 360 TABLET | Refills: 0 | Status: SHIPPED | OUTPATIENT
Start: 2024-11-11

## 2024-11-11 NOTE — TELEPHONE ENCOUNTER
LAST REFILL - 07/01/24 buspirone; 08/01/24 lamotrigine  LAST VISIT - 05/16/24  NEXT VISIT - 11/14/24 (canceled due to Rafal offboarding)    Routing to covering provider Dr. Newman due to Rafal Allison offboarding

## 2024-12-18 ENCOUNTER — TELEPHONE (OUTPATIENT)
Dept: FAMILY MEDICINE CLINIC | Facility: CLINIC | Age: 34
End: 2024-12-18
Payer: COMMERCIAL

## 2024-12-18 DIAGNOSIS — F41.1 GENERALIZED ANXIETY DISORDER WITH PANIC ATTACKS: ICD-10-CM

## 2024-12-18 DIAGNOSIS — F60.3 BORDERLINE PERSONALITY DISORDER: ICD-10-CM

## 2024-12-18 DIAGNOSIS — F33.1 MDD (MAJOR DEPRESSIVE DISORDER), RECURRENT EPISODE, MODERATE: ICD-10-CM

## 2024-12-18 DIAGNOSIS — F41.0 GENERALIZED ANXIETY DISORDER WITH PANIC ATTACKS: ICD-10-CM

## 2024-12-18 RX ORDER — BUPROPION HYDROCHLORIDE 150 MG/1
150 TABLET ORAL EVERY MORNING
Qty: 90 TABLET | Refills: 0 | Status: SHIPPED | OUTPATIENT
Start: 2024-12-18

## 2024-12-18 NOTE — TELEPHONE ENCOUNTER
Patient is needing a refill of:    Wellbutrin 150mg    [facility]  [phone]    Has an appointment with Arpit Dietrich on 2/4/2025.

## 2025-01-06 DIAGNOSIS — F33.1 MDD (MAJOR DEPRESSIVE DISORDER), RECURRENT EPISODE, MODERATE: ICD-10-CM

## 2025-01-06 DIAGNOSIS — G47.00 INSOMNIA, UNSPECIFIED TYPE: ICD-10-CM

## 2025-01-13 RX ORDER — TRAZODONE HYDROCHLORIDE 100 MG/1
TABLET ORAL
Qty: 240 TABLET | Refills: 0 | Status: SHIPPED | OUTPATIENT
Start: 2025-01-13

## 2025-02-17 DIAGNOSIS — F41.1 GENERALIZED ANXIETY DISORDER WITH PANIC ATTACKS: ICD-10-CM

## 2025-02-17 DIAGNOSIS — F41.0 GENERALIZED ANXIETY DISORDER WITH PANIC ATTACKS: ICD-10-CM

## 2025-02-17 DIAGNOSIS — F33.1 MDD (MAJOR DEPRESSIVE DISORDER), RECURRENT EPISODE, MODERATE: ICD-10-CM

## 2025-02-17 DIAGNOSIS — F60.3 BORDERLINE PERSONALITY DISORDER: ICD-10-CM

## 2025-02-17 DIAGNOSIS — G47.00 INSOMNIA, UNSPECIFIED TYPE: ICD-10-CM

## 2025-02-17 NOTE — TELEPHONE ENCOUNTER
LAST REFILL - 11/11/24  LAST VISIT - 05/16/24  NEXT VISIT - new psych appointment with Dr. Dietrich 04/14/25    This is a patient of Rafal Allison's. Routing to covering provider Dr. Newman due to Rafal Allison offboarding. 90 day supply pended for patient to last until new patient appointment with Dr. Dietrich.

## 2025-02-18 RX ORDER — LAMOTRIGINE 100 MG/1
100 TABLET ORAL DAILY
Qty: 90 TABLET | Refills: 0 | Status: SHIPPED | OUTPATIENT
Start: 2025-02-18

## 2025-02-18 RX ORDER — BUSPIRONE HYDROCHLORIDE 10 MG/1
20 TABLET ORAL 2 TIMES DAILY
Qty: 360 TABLET | Refills: 0 | Status: SHIPPED | OUTPATIENT
Start: 2025-02-18

## 2025-03-17 DIAGNOSIS — F41.1 GENERALIZED ANXIETY DISORDER WITH PANIC ATTACKS: ICD-10-CM

## 2025-03-17 DIAGNOSIS — F33.1 MDD (MAJOR DEPRESSIVE DISORDER), RECURRENT EPISODE, MODERATE: ICD-10-CM

## 2025-03-17 DIAGNOSIS — G47.00 INSOMNIA, UNSPECIFIED TYPE: ICD-10-CM

## 2025-03-17 DIAGNOSIS — F41.0 GENERALIZED ANXIETY DISORDER WITH PANIC ATTACKS: ICD-10-CM

## 2025-03-17 DIAGNOSIS — F60.3 BORDERLINE PERSONALITY DISORDER: ICD-10-CM

## 2025-03-17 RX ORDER — BUPROPION HYDROCHLORIDE 150 MG/1
150 TABLET ORAL EVERY MORNING
Qty: 90 TABLET | Refills: 0 | Status: SHIPPED | OUTPATIENT
Start: 2025-03-17

## 2025-03-17 RX ORDER — TRAZODONE HYDROCHLORIDE 100 MG/1
TABLET ORAL
Qty: 240 TABLET | Refills: 0 | Status: SHIPPED | OUTPATIENT
Start: 2025-03-17

## 2025-04-14 ENCOUNTER — OFFICE VISIT (OUTPATIENT)
Age: 35
End: 2025-04-14
Payer: COMMERCIAL

## 2025-04-14 VITALS
DIASTOLIC BLOOD PRESSURE: 66 MMHG | HEIGHT: 66 IN | SYSTOLIC BLOOD PRESSURE: 99 MMHG | WEIGHT: 125 LBS | BODY MASS INDEX: 20.09 KG/M2 | HEART RATE: 62 BPM | OXYGEN SATURATION: 98 %

## 2025-04-14 DIAGNOSIS — F33.1 MDD (MAJOR DEPRESSIVE DISORDER), RECURRENT EPISODE, MODERATE: Primary | ICD-10-CM

## 2025-04-14 DIAGNOSIS — F41.0 GENERALIZED ANXIETY DISORDER WITH PANIC ATTACKS: Chronic | ICD-10-CM

## 2025-04-14 DIAGNOSIS — F60.3 BORDERLINE PERSONALITY DISORDER: ICD-10-CM

## 2025-04-14 DIAGNOSIS — F41.1 GENERALIZED ANXIETY DISORDER WITH PANIC ATTACKS: Chronic | ICD-10-CM

## 2025-04-14 NOTE — PROGRESS NOTES
"Subjective   Liliana Gonzalez is a 34 y.o. adult who presents today for initial evaluation     Chief Complaint: \"My depression and anxiety.\"    History of Present Illness:   Liliana Gonzalez is a 34 year old adult who prefers the use of they/them/theirs pronouns who presents as a new patient to establish care as most recent psychiatric provider, MIKKI Goins is no longer employed with [employer].    Patient reports that they are doing relatively well overall but do endorse a near lifelong history of very psychiatric symptoms.  While patient does report an overall improvement in these symptoms when compared to clinical status several years ago, they do continue to endorse high levels of anxiety associated with near constant feelings of restlessness/feeling on edge, difficulties winding down/relaxing, intermittent periods of irritability, and worrying excessively about most things on most days.  Patient also endorses experiencing a frequent fear that something bad or awful is going to happen associated with acute episodes of anxiety characterized as sudden onset tachycardia, an impending sense of doom, and significant feelings of restlessness.  Patient does consider themselves a worrier and states that they have always worried excessively about most things on most days.  Patient does also endorse a history of prolonged periods of depression lasting anywhere from 2 weeks to several months at a time associated with a significantly depressed mood, decreased levels of energy/fatigue, persistent anhedonia, difficulties initiating/maintaining sleep, a decreased appetite, low self worth, and difficulty sustaining their attention/concentration.  Per chart review it does appear as if patient was diagnosed with borderline personality disorder by most recent psychiatric provider and does once again endorse difficulties maintaining long-term interpersonal relationships, difficulties controlling her anger resulting in frequent " "verbal outbursts, chronic feelings of emptiness, and a persistent fear of abandonment.  Patient does also report a history of excessive alcohol use stating that she has been sober from alcohol since 2019.  Patient does remain active in \"[facility]\" which is reportedly a Anabaptism take on AA/recovery.    Patient otherwise denies ever experiencing any hypomanic/manic symptoms such as a persistently elevated mood lasting 4 days or more at a time associated with a decreased need for sleep, increased levels of energy, increased goal-directed behaviors, racing thoughts, pressured speech, increased grandiosity or uncharacteristic behaviors such as excessive spending on unnecessary items or increased sex drive/promiscuity.  Patient also denies ever experiencing any auditory, visual, or tactile hallucinations and does not currently appear to be responding to internal stimuli.  She denies any history of generalized paranoia and displays no evidence of delusional thinking.    Past Psychiatric History:  Patient does report struggling with numerous depressive and anxious symptoms for most of their life, stating that they did not seek psychiatric care until establishing with MIKKI Goins approximately 2 years ago.  Patient is currently prescribed combination of bupropion  mg p.o. once daily, trazodone 150 to 200 mg p.o. nightly, lamotrigine 100 mg p.o. once daily, and buspirone 15 mg p.o. twice daily.  Patient reports fairly adequate management of psychiatric symptoms at this time but does report a perceived worsening of anxious symptoms as detailed above.  Patient has never been admitted to an inpatient psychiatric unit.  They denied a history of suicide attempts or self-injurious behavior.    Family Psychiatric History:  The patient reports a rather extensive family history of psychiatric disorders.  They do report a history of potential depression, anxiety schizophrenia and substance use in biological " mother.  They also endorse a history of alcohol dependence, depression and potential ADHD in biological father.    Medical History:  Reviewed via EMR.  Patient denies any history of seizure disorders or traumatic brain injuries.    Substance Abuse History:  As detailed above patient reports complete sobriety from alcohol since 2019.  They denied the use of cannabis or any other illegal/illicit substance.    Social History:  Patient was born and primarily raised in [place], [place] before moving to [place] around the age of 10 following their parents divorce.  Patient reports a relatively difficult childhood growing up due to the reported psychiatric and substance use issues of both her biological parents.  Patient states that they always struggled academically ultimately resulting in them dropping out around the ninth grade.  Patient did recently end a long-term relationship and is currently living alone.  They work as a  at [employer] off market here in town.  They do report a history of a DUI obtained at the age of 23.  They deny any  experience.    The following portions of the patient's history were reviewed and updated as appropriate: allergies, current medications, past family history, past medical history, past social history, past surgical history and problem list.       Past Medical History:  Past Medical History:   Diagnosis Date    ETOH abuse     per records from [facility] 12/22       Social History:  Social History     Socioeconomic History    Marital status: Single    Number of children: 0    Highest education level: 9th grade   Tobacco Use    Smoking status: Never     Passive exposure: Yes    Smokeless tobacco: Never    Tobacco comments:     every few years i pick smoking back up, presently vaping, no cigarettes.   Vaping Use    Vaping status: Every Day    Substances: Nicotine    Devices: Disposable, Pre-filled or refillable cartridge   Substance and Sexual Activity    Alcohol use: Never      Comment: Sober since March 2019    Drug use: Not Currently    Sexual activity: Not Currently     Partners: Female, Male     Birth control/protection: Condom, I.U.D., Bilateral salpingectomy        Family History:  Family History   Problem Relation Age of Onset    Depression Mother     Anxiety disorder Mother     Schizophrenia Mother     Drug abuse Mother     Alcohol abuse Mother     Learning disabilities Mother     OCD Mother     Learning disabilities Father     Alcohol abuse Father     Drug abuse Father     Depression Father     ADD / ADHD Father     Self-Injurious Behavior  Sister     Suicide Attempts Sister     Alcohol abuse Maternal Grandmother         great grandmother actually, [place]    Anxiety disorder Maternal Grandmother     OCD Maternal Grandmother     Alcohol abuse Maternal Grandfather        Past Surgical History:  History reviewed. No pertinent surgical history.    Problem List:  Patient Active Problem List   Diagnosis    MDD (major depressive disorder), recurrent episode, moderate    Generalized anxiety disorder with panic attacks    PTSD (post-traumatic stress disorder)    Insomnia    Borderline personality disorder       Allergy:   Allergies   Allergen Reactions    Rice GI Intolerance     Swollen joints        Current Medications:   Current Outpatient Medications   Medication Sig Dispense Refill    buPROPion XL (WELLBUTRIN XL) 150 MG 24 hr tablet Take 1 tablet by mouth Every Morning. 90 tablet 0    busPIRone (BUSPAR) 10 MG tablet Take 2 tablets by mouth 2 (Two) Times a Day. 360 tablet 0    lamoTRIgine (LaMICtal) 100 MG tablet TAKE 1 TABLET BY MOUTH DAILY 90 tablet 0    Testosterone Cypionate (DEPOTESTOTERONE CYPIONATE) 200 MG/ML injection       traZODone (DESYREL) 100 MG tablet TAKE 1.5 TO 2 TABLETS BY MOUTH AT NIGHT AS NEEDED FOR SLEEP 240 tablet 0     No current facility-administered medications for this visit.       Review of Symptoms:    Review of Systems   Constitutional:  Positive for  "activity change and fatigue.   HENT:  Negative for tinnitus.    Endocrine: Negative for cold intolerance and heat intolerance.   Skin:  Negative for rash.   Neurological:  Negative for seizures and confusion.   Psychiatric/Behavioral:  Positive for decreased concentration, sleep disturbance and depressed mood. Negative for hallucinations, self-injury and suicidal ideas. The patient is nervous/anxious.          Physical Exam:   Blood pressure 99/66, pulse 62, height 167.6 cm (65.98\"), weight 56.7 kg (125 lb), SpO2 98%.  Appearance: Appears documented age, appropriate hygiene and grooming.  Gait, Station, Strength: Normal gait, station and strength.       Mental Status Exam:   Hygiene:   good  Cooperation:  Cooperative  Eye Contact:  Good  Psychomotor Behavior:  Restless  Affect:  Full range and Appropriate  Mood: sad and anxious  Hopelessness: Denies  Speech:  Normal  Thought Process:  Goal directed and Linear  Thought Content:  Normal  Suicidal:  None  Homicidal:  None  Hallucinations:  None  Delusion:  None  Memory:  Intact  Orientation:  Person, Place, Time, and Situation  Reliability:  good  Insight:  Good  Judgement:  Good  Impulse Control:  Good      Lab Results:   No visits with results within 1 Month(s) from this visit.   Latest known visit with results is:   No results found for any previous visit.       PHQ-9 Total Score: 13   SELAM-7 Total Score: 18     Assessment & Plan    Diagnoses and all orders for this visit:    1. MDD (major depressive disorder), recurrent episode, moderate (Primary)    2. Generalized anxiety disorder with panic attacks    3. Borderline personality disorder       Liliana Gonzalez is a 34 year old adult who prefers the use of they/them/theirs pronouns who presents as a new patient to establish care as most recent psychiatric provider, MIKKI Goins is no longer employed with [facility].    Upon today's evaluation patient reports displays numerous signs/symptoms most consistent " with that of major depressive disorder, generalized anxiety disorder and borderline personality disorder.  As detailed above patient reports struggling with various psychiatric symptoms for most of their life stating that they did not seek psychiatric care until approximately 2 years ago.  Overall, patient does report an overall improvement in psychiatric symptoms when compared to clinical status several years ago but does endorse a progressive worsening of numerous anxious symptoms over the past several months.  Patient does report numerous psychosocial stressors primarily involving a the recent termination of a long-term relationship, and does acknowledge that this stressor very likely has contributed to the persistence and severity of their ongoing symptoms.  As such patient was highly encouraged to remain active in routine psychotherapy and hopes of addressing this ongoing psychosocial stressors.  Otherwise, I do recommend further increasing daily dose of buspirone from 30 mg to 40 mg daily at this time.  Patient was advised to take buspirone 20 mg p.o. twice daily or to continue with buspirone 15 mg p.o. twice daily with a midday dose of 10 mg around noon.  Pending tolerance/response we will discuss further maximizing daily dose of buspirone in the future if necessary.  Otherwise I recommend continuing all of their psychiatric medications at this time and we will see the patient again in approximately 7 weeks for reassessment.  Patient voices understanding of this and is agreeable to this plan.    Medications:  - Increase buspirone from 30 mg to 40 mg daily at this time.  Patient advised to take either 20 mg p.o. twice daily or 15 mg p.o. twice daily with a midday dose of 10 mg around noon.  -Continue bupropion  mg p.o. once daily.  - Continue trazodone 150 to 200 mg p.o. nightly for management of insomnia.  - Continue lamotrigine 100 mg p.o. once daily.    TREATMENT PLAN - SHORT AND LONG-TERM GOALS:    -Continue supportive psychotherapy efforts and medications as indicated. Treatment and medication options discussed during today's visit.   -Patient acknowledged and verbally consented to continue with current treatment plan and was educated on the importance of compliance with treatment and follow-up appointments.    SUMMARY/EDUCATION/DISCUSSION:  -Pt was given appropriate time to ask questions and concerns were addressed. A thorough discussion was had that included review of disease process, need for continued monitoring and additional treatment options including use of pharmacological and non-pharmacological approaches to care, decisions were made and agreed upon by patient and provider.   -Discussed medication options and treatment plan of prescribed medication as well as the risks, benefits, and side effects including potential falls, possible impaired driving and metabolic adversities among others; patient acknowledged and provided verbal consent.   -Patient has been educated regarding multimodal approach with healthy nutrition, healthy sleep, regular physical activity, social activities, counseling, and medications.  -Please call the office at [phone] within normal business hours (Monday-Friday, 8:00 AM - 4:30 PM) with any worsening of symptoms or onset of intolerable side effects. Please ask to leave a message with office staff.  Please allow up to 24-48 hours for response to a patient call/question/refill request.  -Safety plan has been established and discussed in detail with the patient, who is agreeable to contact support system and/or call 911 or go to the nearest ER should he/she/they have any thoughts of harm to self or others.    MEDS ORDERED DURING VISIT:  No orders of the defined types were placed in this encounter.      FOLLOW UP:  Return in about 7 weeks (around 6/2/2025) for Next scheduled follow up.      Arpit Dietrich,     This document has been electronically signed by Arpit  JENNIFER Dietrich DO  April 30, 2025 16:37 EDT    Part of this note may be an electronic transcription/translation of spoken language to printed text using the Dragon Dictation System. Some of the data in this electronic note has been brought forward from a previous encounter, any necessary changes have been made, it has been reviewed by this provider, and it is accurate.

## 2025-04-18 ENCOUNTER — PATIENT ROUNDING (BHMG ONLY) (OUTPATIENT)
Age: 35
End: 2025-04-18
Payer: COMMERCIAL

## 2025-04-28 ENCOUNTER — TELEPHONE (OUTPATIENT)
Age: 35
End: 2025-04-28
Payer: COMMERCIAL

## 2025-04-28 DIAGNOSIS — G47.00 INSOMNIA, UNSPECIFIED TYPE: ICD-10-CM

## 2025-04-28 DIAGNOSIS — F33.1 MDD (MAJOR DEPRESSIVE DISORDER), RECURRENT EPISODE, MODERATE: ICD-10-CM

## 2025-04-28 RX ORDER — TRAZODONE HYDROCHLORIDE 100 MG/1
TABLET ORAL
Qty: 240 TABLET | Refills: 0 | OUTPATIENT
Start: 2025-04-28

## 2025-04-28 NOTE — TELEPHONE ENCOUNTER
Patient called and stated that her trazodone is supposed to be 90 day supply instead of 30 asked if you can send in the 90 days. Please.

## 2025-04-28 NOTE — TELEPHONE ENCOUNTER
240 tablets were sent to patient's pharmacy on 3/17/2025 when I was covering patient's refills for another provider before she was seen for her initial visit.  This should be a 4-month supply and should last patient until July.

## 2025-05-22 DIAGNOSIS — F41.0 GENERALIZED ANXIETY DISORDER WITH PANIC ATTACKS: ICD-10-CM

## 2025-05-22 DIAGNOSIS — G47.00 INSOMNIA, UNSPECIFIED TYPE: ICD-10-CM

## 2025-05-22 DIAGNOSIS — F41.1 GENERALIZED ANXIETY DISORDER WITH PANIC ATTACKS: ICD-10-CM

## 2025-05-22 DIAGNOSIS — F33.1 MDD (MAJOR DEPRESSIVE DISORDER), RECURRENT EPISODE, MODERATE: ICD-10-CM

## 2025-05-22 DIAGNOSIS — F60.3 BORDERLINE PERSONALITY DISORDER: ICD-10-CM

## 2025-05-22 RX ORDER — LAMOTRIGINE 100 MG/1
100 TABLET ORAL DAILY
Qty: 90 TABLET | Refills: 0 | OUTPATIENT
Start: 2025-05-22

## 2025-05-22 RX ORDER — BUSPIRONE HYDROCHLORIDE 10 MG/1
20 TABLET ORAL 2 TIMES DAILY
Qty: 360 TABLET | Refills: 0 | OUTPATIENT
Start: 2025-05-22

## 2025-05-30 DIAGNOSIS — F41.1 GENERALIZED ANXIETY DISORDER WITH PANIC ATTACKS: ICD-10-CM

## 2025-05-30 DIAGNOSIS — F41.0 GENERALIZED ANXIETY DISORDER WITH PANIC ATTACKS: ICD-10-CM

## 2025-05-30 DIAGNOSIS — F60.3 BORDERLINE PERSONALITY DISORDER: ICD-10-CM

## 2025-05-30 DIAGNOSIS — G47.00 INSOMNIA, UNSPECIFIED TYPE: ICD-10-CM

## 2025-05-30 DIAGNOSIS — F33.1 MDD (MAJOR DEPRESSIVE DISORDER), RECURRENT EPISODE, MODERATE: ICD-10-CM

## 2025-05-30 RX ORDER — LAMOTRIGINE 100 MG/1
100 TABLET ORAL DAILY
Qty: 90 TABLET | Refills: 0 | Status: CANCELLED | OUTPATIENT
Start: 2025-05-30

## 2025-06-02 ENCOUNTER — OFFICE VISIT (OUTPATIENT)
Age: 35
End: 2025-06-02
Payer: COMMERCIAL

## 2025-06-02 VITALS
HEIGHT: 66 IN | HEART RATE: 63 BPM | SYSTOLIC BLOOD PRESSURE: 117 MMHG | DIASTOLIC BLOOD PRESSURE: 69 MMHG | BODY MASS INDEX: 19.29 KG/M2 | WEIGHT: 120 LBS | OXYGEN SATURATION: 99 %

## 2025-06-02 DIAGNOSIS — F41.0 GENERALIZED ANXIETY DISORDER WITH PANIC ATTACKS: Chronic | ICD-10-CM

## 2025-06-02 DIAGNOSIS — F41.1 GENERALIZED ANXIETY DISORDER WITH PANIC ATTACKS: Chronic | ICD-10-CM

## 2025-06-02 DIAGNOSIS — G47.00 INSOMNIA, UNSPECIFIED TYPE: ICD-10-CM

## 2025-06-02 DIAGNOSIS — F60.3 BORDERLINE PERSONALITY DISORDER: ICD-10-CM

## 2025-06-02 DIAGNOSIS — F33.1 MDD (MAJOR DEPRESSIVE DISORDER), RECURRENT EPISODE, MODERATE: Primary | ICD-10-CM

## 2025-06-02 PROCEDURE — 1159F MED LIST DOCD IN RCRD: CPT | Performed by: STUDENT IN AN ORGANIZED HEALTH CARE EDUCATION/TRAINING PROGRAM

## 2025-06-02 PROCEDURE — 99214 OFFICE O/P EST MOD 30 MIN: CPT | Performed by: STUDENT IN AN ORGANIZED HEALTH CARE EDUCATION/TRAINING PROGRAM

## 2025-06-02 PROCEDURE — 96127 BRIEF EMOTIONAL/BEHAV ASSMT: CPT | Performed by: STUDENT IN AN ORGANIZED HEALTH CARE EDUCATION/TRAINING PROGRAM

## 2025-06-02 PROCEDURE — 1160F RVW MEDS BY RX/DR IN RCRD: CPT | Performed by: STUDENT IN AN ORGANIZED HEALTH CARE EDUCATION/TRAINING PROGRAM

## 2025-06-02 RX ORDER — BUSPIRONE HYDROCHLORIDE 10 MG/1
TABLET ORAL
Qty: 42 TABLET | Refills: 0 | Status: SHIPPED | OUTPATIENT
Start: 2025-06-02 | End: 2025-06-23

## 2025-06-02 RX ORDER — SERTRALINE HYDROCHLORIDE 25 MG/1
TABLET, FILM COATED ORAL
Qty: 56 TABLET | Refills: 0 | Status: SHIPPED | OUTPATIENT
Start: 2025-06-02 | End: 2025-07-07

## 2025-06-02 NOTE — PROGRESS NOTES
Leonel Gonzalez is a 34 y.o. adult who presents today in follow up for management of major depressive disorder, generalized anxiety disorder, insomnia and borderline personality disorder.    Patient reports the progressive worsening of numerous anxious symptoms when compared to clinical status at last visit approximately 7 weeks ago.  More specifically, patient reports significantly high levels of anxiety associated with increased difficulties appropriately managing/stopping their anxiety and worry.  They also report near constant feelings of restlessness/feeling on edge associated with intermittent periods of irritability and difficulties winding down/relaxing.  Patient also reports experiencing a frequent fear that something bad or awful is going to happen in addition to frequently catastrophizing and thinking of worst case scenarios.  Patient does report that these symptoms have negatively impacted various aspects of both their social and occupational functioning over the past several weeks to 1 month.  Patient does also report a mild worsening in mood associated with decreased levels of energy/fatigue, difficulties initiating/maintaining sleep, persistent anhedonia, noticeable psychomotor retardation, and difficulty sustaining their attention/focus.  Patient does deny any active suicidal ideation, intent or plan.  Patient does continue to endorse difficulties with interpersonal relationship difficulties as well as issues controlling their anger at times. Patient otherwise denies any auditory, visual, or tactile hallucinations and does not currently appear to be responding to internal stimuli.  Patient denies any generalized paranoia and displays no evidence of delusional thinking.    The following portions of the patient's history were reviewed and updated as appropriate: allergies, current medications, past family history, past medical history, past social history, past surgical history and problem  list.  History of Present Illness               Past Medical History:  Past Medical History:   Diagnosis Date    ETOH abuse     per records from [facility] 12/22       Social History:  Social History     Socioeconomic History    Marital status: Single    Number of children: 0    Highest education level: 9th grade   Tobacco Use    Smoking status: Never     Passive exposure: Yes    Smokeless tobacco: Never    Tobacco comments:     every few years i pick smoking back up, presently vaping, no cigarettes.   Vaping Use    Vaping status: Every Day    Substances: Nicotine    Devices: Disposable, Pre-filled or refillable cartridge   Substance and Sexual Activity    Alcohol use: Never     Comment: Sober since March 2019    Drug use: Not Currently    Sexual activity: Not Currently     Partners: Female, Male     Birth control/protection: Condom, I.U.D., Bilateral salpingectomy        Family History:  Family History   Problem Relation Age of Onset    Depression Mother     Anxiety disorder Mother     Schizophrenia Mother     Drug abuse Mother     Alcohol abuse Mother     Learning disabilities Mother     OCD Mother     Learning disabilities Father     Alcohol abuse Father     Drug abuse Father     Depression Father     ADD / ADHD Father     Self-Injurious Behavior  Sister     Suicide Attempts Sister     Alcohol abuse Maternal Grandmother         great grandmother Ian figueroaothy    Anxiety disorder Maternal Grandmother     OCD Maternal Grandmother     Alcohol abuse Maternal Grandfather        Past Surgical History:  History reviewed. No pertinent surgical history.    Problem List:  Patient Active Problem List   Diagnosis    MDD (major depressive disorder), recurrent episode, moderate    Generalized anxiety disorder with panic attacks    PTSD (post-traumatic stress disorder)    Insomnia    Borderline personality disorder       Allergy:   Allergies   Allergen Reactions    Rice GI Intolerance     Swollen joints        Current  "Medications:   Current Outpatient Medications   Medication Sig Dispense Refill    busPIRone (BUSPAR) 10 MG tablet Take 1 tablet by mouth 3 (Three) Times a Day for 7 days, THEN 1 tablet 2 (Two) Times a Day for 7 days, THEN 1 tablet Daily for 7 days. Discontinue thereafter. 42 tablet 0    buPROPion XL (WELLBUTRIN XL) 150 MG 24 hr tablet Take 1 tablet by mouth Every Morning. 90 tablet 0    lamoTRIgine (LaMICtal) 100 MG tablet TAKE 1 TABLET BY MOUTH DAILY 90 tablet 0    sertraline (Zoloft) 25 MG tablet Take 1 tablet by mouth Daily for 14 days, THEN 1 tablet Daily for 21 days. 56 tablet 0    Testosterone Cypionate (DEPOTESTOTERONE CYPIONATE) 200 MG/ML injection       traZODone (DESYREL) 100 MG tablet TAKE 1.5 TO 2 TABLETS BY MOUTH AT NIGHT AS NEEDED FOR SLEEP 240 tablet 0     No current facility-administered medications for this visit.       Review of Symptoms:    Review of Systems   Constitutional:  Positive for fatigue. Negative for chills, diaphoresis and fever.   HENT:  Negative for congestion, sore throat and swollen glands.    Respiratory:  Negative for cough.    Cardiovascular:  Positive for chest pain.   Gastrointestinal:  Negative for abdominal pain, nausea and vomiting.   Genitourinary:  Negative for dysuria.   Musculoskeletal:  Positive for myalgias and neck pain.   Skin:  Negative for rash.   Neurological:  Positive for numbness. Negative for weakness and confusion.   Psychiatric/Behavioral:  Positive for decreased concentration, sleep disturbance, depressed mood and stress. Negative for hallucinations, self-injury and suicidal ideas. The patient is nervous/anxious.          Physical Exam:   Blood pressure 117/69, pulse 63, height 167.6 cm (65.98\"), weight 54.4 kg (120 lb), SpO2 99%.  Appearance: Appears documented age, appropriate hygiene and grooming.  Gait, Station, Strength: Normal gait, station and strength.  Physical Exam               Mental Status Exam:   Hygiene:   good  Cooperation:  " Cooperative  Eye Contact:  Good  Psychomotor Behavior:  Restless  Affect:  Full range and Appropriate  Mood: sad and anxious  Hopelessness: Denies  Speech:  Normal  Thought Process:  Goal directed and Linear  Thought Content:  Normal  Suicidal:  None  Homicidal:  None  Hallucinations:  None  Delusion:  None  Memory:  Intact  Orientation:  Person, Place, Time, and Situation  Reliability:  good  Insight:  Good  Judgement:  Good  Impulse Control:  Good      Lab Results:   No visits with results within 1 Month(s) from this visit.   Latest known visit with results is:   No results found for any previous visit.     Results            PHQ-9 Total Score: 12    SELAM-7 Total Score: 21     Assessment & Plan    Diagnoses and all orders for this visit:    1. MDD (major depressive disorder), recurrent episode, moderate (Primary)  -     sertraline (Zoloft) 25 MG tablet; Take 1 tablet by mouth Daily for 14 days, THEN 1 tablet Daily for 21 days.  Dispense: 56 tablet; Refill: 0  -     busPIRone (BUSPAR) 10 MG tablet; Take 1 tablet by mouth 3 (Three) Times a Day for 7 days, THEN 1 tablet 2 (Two) Times a Day for 7 days, THEN 1 tablet Daily for 7 days. Discontinue thereafter.  Dispense: 42 tablet; Refill: 0    2. Generalized anxiety disorder with panic attacks  -     sertraline (Zoloft) 25 MG tablet; Take 1 tablet by mouth Daily for 14 days, THEN 1 tablet Daily for 21 days.  Dispense: 56 tablet; Refill: 0  -     busPIRone (BUSPAR) 10 MG tablet; Take 1 tablet by mouth 3 (Three) Times a Day for 7 days, THEN 1 tablet 2 (Two) Times a Day for 7 days, THEN 1 tablet Daily for 7 days. Discontinue thereafter.  Dispense: 42 tablet; Refill: 0    3. Borderline personality disorder  -     busPIRone (BUSPAR) 10 MG tablet; Take 1 tablet by mouth 3 (Three) Times a Day for 7 days, THEN 1 tablet 2 (Two) Times a Day for 7 days, THEN 1 tablet Daily for 7 days. Discontinue thereafter.  Dispense: 42 tablet; Refill: 0    4. Insomnia, unspecified type  -      sertraline (Zoloft) 25 MG tablet; Take 1 tablet by mouth Daily for 14 days, THEN 1 tablet Daily for 21 days.  Dispense: 56 tablet; Refill: 0  -     busPIRone (BUSPAR) 10 MG tablet; Take 1 tablet by mouth 3 (Three) Times a Day for 7 days, THEN 1 tablet 2 (Two) Times a Day for 7 days, THEN 1 tablet Daily for 7 days. Discontinue thereafter.  Dispense: 42 tablet; Refill: 0         Liliana Gonzalez is a 34 y.o. adult who presents today in follow up for management of major depressive disorder, generalized anxiety disorder, insomnia and borderline personality disorder.    As detailed above patient reports the persistence and progressive worsening of various anxious symptoms when compared to clinical status at last visit.  They report consistent compliance with increased dose of buspirone, denied any associated side effects and appear to be tolerating this medication well.  They do however report little to no therapeutic benefit associated with this medication and state that they have not perceived much improvement in anxious symptoms following its initiation over a year ago.  Despite consistent compliance with prescribed psychiatric medications patient endorses the recent worsening of numerous depressive and anxious symptoms as evidenced by current PHQ-9 and SELAM-7 scores.  As such, I do feel as if patient is in need of medication adjustments at this time and I recommend discontinuing prescribe buspirone in favor of an SSRI medication such as sertraline in hopes of addressing their persistent symptoms.  Patient was advised to decrease daily dosage of buspirone by 10 mg every 7 days for the next 3 weeks followed by eventual discontinuation.  They were also advised to initiate sertraline at 25 mg p.o. once daily x 2 weeks followed by further increased to sertraline 50 mg p.o. once daily thereafter pending tolerance/response.  We will make no further medication adjustments at this time.  Otherwise, patient will be seen again here  in the office in approximately 5 weeks for reassessment.  Patient voices understanding of this and is agreeable to this plan.    Medications:  - Decrease buspirone from 40 mg to 30 mg p.o. once daily with patient advised to take 15 mg p.o. twice daily or 10 mg p.o. 3 times daily x 7 days.  Further decrease to buspirone 10 mg p.o. twice daily thereafter and continue for an additional 7 days.  Finally, decreased buspirone to 10 mg p.o. once daily and continue for 7 days, discontinue thereafter.  - Initiate sertraline 25 mg p.o. once daily x 14 days.  Pending tolerance/response further increased to sertraline 50 mg p.o. once daily thereafter and continue until next scheduled appointment.  -Continue bupropion  mg p.o. once daily.  - Continue trazodone 150 to 200 mg p.o. nightly for management of insomnia.  - Continue lamotrigine 100 mg p.o. once daily.      TREATMENT PLAN - SHORT AND LONG-TERM GOALS:   -Continue supportive psychotherapy efforts and medications as indicated. Treatment and medication options discussed during today's visit.   -Patient acknowledged and verbally consented to continue with current treatment plan and was educated on the importance of compliance with treatment and follow-up appointments.    SUMMARY/EDUCATION/DISCUSSION:  -Pt was given appropriate time to ask questions and concerns were addressed. A thorough discussion was had that included review of disease process, need for continued monitoring and additional treatment options including use of pharmacological and non-pharmacological approaches to care, decisions were made and agreed upon by patient and provider.   -Discussed medication options and treatment plan of prescribed medication as well as the risks, benefits, and side effects including potential falls, possible impaired driving and metabolic adversities among others; patient acknowledged and provided verbal consent.   -Patient has been educated regarding multimodal approach with  healthy nutrition, healthy sleep, regular physical activity, social activities, counseling, and medications.  -Please call the office at [phone] within normal business hours (Monday-Friday, 8:00 AM - 4:30 PM) with any worsening of symptoms or onset of intolerable side effects. Please ask to leave a message with office staff.  Please allow up to 24-48 hours for response to a patient call/question/refill request.  -Safety plan has been established and discussed in detail with the patient, who is agreeable to contact support system and/or call 911 or go to the nearest ER should he/she/they have any thoughts of harm to self or others.    MEDS ORDERED DURING VISIT:  New Medications Ordered This Visit   Medications    sertraline (Zoloft) 25 MG tablet     Sig: Take 1 tablet by mouth Daily for 14 days, THEN 1 tablet Daily for 21 days.     Dispense:  56 tablet     Refill:  0    busPIRone (BUSPAR) 10 MG tablet     Sig: Take 1 tablet by mouth 3 (Three) Times a Day for 7 days, THEN 1 tablet 2 (Two) Times a Day for 7 days, THEN 1 tablet Daily for 7 days. Discontinue thereafter.     Dispense:  42 tablet     Refill:  0       FOLLOW UP:  Return in about 5 weeks (around 7/7/2025) for Next scheduled follow up.      Arpit Dietrich DO    This document has been electronically signed by Arpit Dietrich DO  June 2, 2025 11:29 EDT    Part of this note may be an electronic transcription/translation of spoken language to printed text using the Dragon Dictation System. Some of the data in this electronic note has been brought forward from a previous encounter, any necessary changes have been made, it has been reviewed by this provider, and it is accurate.    Answers submitted by the patient for this visit:  Problem not listed (Submitted on 5/30/2025)  Chief Complaint: Other medical problem  anorexia: No  joint pain: Yes  change in stool: Yes  headaches: No  joint swelling: No  vertigo: No  visual change: Yes  Onset: more than  5 years  Chronicity: chronic  Frequency: constantly

## 2025-06-05 DIAGNOSIS — G47.00 INSOMNIA, UNSPECIFIED TYPE: ICD-10-CM

## 2025-06-05 DIAGNOSIS — F60.3 BORDERLINE PERSONALITY DISORDER: ICD-10-CM

## 2025-06-05 DIAGNOSIS — F41.0 GENERALIZED ANXIETY DISORDER WITH PANIC ATTACKS: ICD-10-CM

## 2025-06-05 DIAGNOSIS — F41.1 GENERALIZED ANXIETY DISORDER WITH PANIC ATTACKS: ICD-10-CM

## 2025-06-05 DIAGNOSIS — F33.1 MDD (MAJOR DEPRESSIVE DISORDER), RECURRENT EPISODE, MODERATE: ICD-10-CM

## 2025-06-05 RX ORDER — LAMOTRIGINE 100 MG/1
100 TABLET ORAL DAILY
Qty: 90 TABLET | Refills: 0 | Status: CANCELLED | OUTPATIENT
Start: 2025-06-05

## 2025-06-06 ENCOUNTER — TELEPHONE (OUTPATIENT)
Age: 35
End: 2025-06-06
Payer: COMMERCIAL

## 2025-06-06 DIAGNOSIS — F41.1 GENERALIZED ANXIETY DISORDER WITH PANIC ATTACKS: ICD-10-CM

## 2025-06-06 DIAGNOSIS — F60.3 BORDERLINE PERSONALITY DISORDER: ICD-10-CM

## 2025-06-06 DIAGNOSIS — G47.00 INSOMNIA, UNSPECIFIED TYPE: ICD-10-CM

## 2025-06-06 DIAGNOSIS — F41.0 GENERALIZED ANXIETY DISORDER WITH PANIC ATTACKS: ICD-10-CM

## 2025-06-06 DIAGNOSIS — F33.1 MDD (MAJOR DEPRESSIVE DISORDER), RECURRENT EPISODE, MODERATE: ICD-10-CM

## 2025-06-06 RX ORDER — LAMOTRIGINE 100 MG/1
100 TABLET ORAL DAILY
Qty: 90 TABLET | Refills: 0 | OUTPATIENT
Start: 2025-06-06

## 2025-06-06 RX ORDER — LAMOTRIGINE 100 MG/1
100 TABLET ORAL DAILY
Qty: 90 TABLET | Refills: 0 | Status: SHIPPED | OUTPATIENT
Start: 2025-06-06

## 2025-06-06 NOTE — TELEPHONE ENCOUNTER
PT called and stated that they have been without their medication for 3+ days and is in need of a refill of their Lamictal 100mg and the pharmacy that is on file is the pharmacy is where the PT would like for the medication to be sent to.

## 2025-06-06 NOTE — TELEPHONE ENCOUNTER
Patient was inpatient. Patient is not seen at the [facility]. Patient will need to call office   and schedule an appointment with a provider for medication management

## 2025-06-10 DIAGNOSIS — F60.3 BORDERLINE PERSONALITY DISORDER: ICD-10-CM

## 2025-06-10 DIAGNOSIS — F33.1 MDD (MAJOR DEPRESSIVE DISORDER), RECURRENT EPISODE, MODERATE: ICD-10-CM

## 2025-06-10 DIAGNOSIS — F41.1 GENERALIZED ANXIETY DISORDER WITH PANIC ATTACKS: ICD-10-CM

## 2025-06-10 DIAGNOSIS — F41.0 GENERALIZED ANXIETY DISORDER WITH PANIC ATTACKS: ICD-10-CM

## 2025-06-11 DIAGNOSIS — F33.1 MDD (MAJOR DEPRESSIVE DISORDER), RECURRENT EPISODE, MODERATE: ICD-10-CM

## 2025-06-11 DIAGNOSIS — F41.1 GENERALIZED ANXIETY DISORDER WITH PANIC ATTACKS: ICD-10-CM

## 2025-06-11 DIAGNOSIS — F41.0 GENERALIZED ANXIETY DISORDER WITH PANIC ATTACKS: ICD-10-CM

## 2025-06-11 DIAGNOSIS — F60.3 BORDERLINE PERSONALITY DISORDER: ICD-10-CM

## 2025-06-11 RX ORDER — BUPROPION HYDROCHLORIDE 150 MG/1
150 TABLET ORAL EVERY MORNING
Qty: 90 TABLET | Refills: 0 | OUTPATIENT
Start: 2025-06-11

## 2025-06-16 DIAGNOSIS — F33.1 MDD (MAJOR DEPRESSIVE DISORDER), RECURRENT EPISODE, MODERATE: ICD-10-CM

## 2025-06-16 DIAGNOSIS — G47.00 INSOMNIA, UNSPECIFIED TYPE: ICD-10-CM

## 2025-06-16 RX ORDER — TRAZODONE HYDROCHLORIDE 100 MG/1
TABLET ORAL
Qty: 240 TABLET | Refills: 0 | Status: SHIPPED | OUTPATIENT
Start: 2025-06-16

## 2025-06-16 NOTE — TELEPHONE ENCOUNTER
Rx Refill Note  Requested Prescriptions     Pending Prescriptions Disp Refills    traZODone (DESYREL) 100 MG tablet 240 tablet 0     Sig: TAKE 1.5 TO 2 TABLETS BY MOUTH AT NIGHT AS NEEDED FOR SLEEP      Last office visit with prescribing clinician: 6/2/2025   Last telemedicine visit with prescribing clinician: Visit date not found   Next office visit with prescribing clinician: 7/7/2025     Jim Crystal MA  06/16/25, 13:39 EDT

## 2025-06-20 DIAGNOSIS — F41.1 GENERALIZED ANXIETY DISORDER WITH PANIC ATTACKS: ICD-10-CM

## 2025-06-20 DIAGNOSIS — F41.0 GENERALIZED ANXIETY DISORDER WITH PANIC ATTACKS: ICD-10-CM

## 2025-06-20 DIAGNOSIS — F33.1 MDD (MAJOR DEPRESSIVE DISORDER), RECURRENT EPISODE, MODERATE: ICD-10-CM

## 2025-06-20 DIAGNOSIS — F60.3 BORDERLINE PERSONALITY DISORDER: ICD-10-CM

## 2025-06-20 RX ORDER — BUPROPION HYDROCHLORIDE 150 MG/1
150 TABLET ORAL EVERY MORNING
Qty: 90 TABLET | Refills: 0 | Status: SHIPPED | OUTPATIENT
Start: 2025-06-20

## 2025-07-07 ENCOUNTER — OFFICE VISIT (OUTPATIENT)
Age: 35
End: 2025-07-07
Payer: COMMERCIAL

## 2025-07-07 VITALS
DIASTOLIC BLOOD PRESSURE: 68 MMHG | BODY MASS INDEX: 20.09 KG/M2 | OXYGEN SATURATION: 95 % | SYSTOLIC BLOOD PRESSURE: 118 MMHG | HEART RATE: 61 BPM | HEIGHT: 66 IN | WEIGHT: 125 LBS

## 2025-07-07 DIAGNOSIS — F60.3 BORDERLINE PERSONALITY DISORDER: ICD-10-CM

## 2025-07-07 DIAGNOSIS — F41.1 GENERALIZED ANXIETY DISORDER WITH PANIC ATTACKS: Chronic | ICD-10-CM

## 2025-07-07 DIAGNOSIS — F51.05 INSOMNIA DUE TO OTHER MENTAL DISORDER: ICD-10-CM

## 2025-07-07 DIAGNOSIS — F33.1 MDD (MAJOR DEPRESSIVE DISORDER), RECURRENT EPISODE, MODERATE: Primary | ICD-10-CM

## 2025-07-07 DIAGNOSIS — F99 INSOMNIA DUE TO OTHER MENTAL DISORDER: ICD-10-CM

## 2025-07-07 DIAGNOSIS — F41.0 GENERALIZED ANXIETY DISORDER WITH PANIC ATTACKS: Chronic | ICD-10-CM

## 2025-07-07 RX ORDER — SERTRALINE HYDROCHLORIDE 25 MG/1
25 TABLET, FILM COATED ORAL DAILY
Qty: 14 TABLET | Refills: 0 | Status: SHIPPED | OUTPATIENT
Start: 2025-07-07 | End: 2025-07-21

## 2025-07-07 NOTE — PROGRESS NOTES
Leonel Gonzalez is a 34 y.o. adult who presents today in follow up for management of major depressive disorder, generalized anxiety disorder, insomnia and borderline personality disorder.     Patient reports the progressive worsening of various psychiatric symptoms when compared to clinical status at last visit following discontinuation of buspirone and initiation of sertraline.  More specifically, patient reports persistent anhedonia associated with decreased levels of energy/fatigue, difficulties initiating/maintaining sleep, low self worth, excessive feelings of guilt, and difficulties directing her attention/concentration.  Patient currently denies any active suicidal ideation, intent or plan.  Patient also reports significantly increased levels of anxiety associated with an ability to appropriately control/manage her anxiety and worry.  They also report frequently catastrophizing/thinking a worst case scenarios associated with a frequent fear that something bad or awful is going to happen.  They endorse near constant feelings of restlessness associated with difficulties winding down/relaxing and frequent periods of irritability.  Patient does feel as if the symptoms have significantly worsened over the past several weeks despite reported compliance with all psychiatric medications.  Patient was able to successfully discontinue buspirone and has consistently been taking her sertraline, but does feel as if current dosage of sertraline is not adequately addressing her psychiatric symptoms and may in fact be contributing to their recent exacerbation of symptoms. Patient otherwise denies any auditory, visual, or tactile hallucinations and does not currently appear to be responding to internal stimuli.  Patient denies any generalized paranoia and displays no evidence of delusional thinking.    The following portions of the patient's history were reviewed and updated as appropriate: allergies, current  medications, past family history, past medical history, past social history, past surgical history and problem list.  History of Present Illness               Past Medical History:  Past Medical History:   Diagnosis Date    ETOH abuse     per records from [facility] 12/22       Social History:  Social History     Socioeconomic History    Marital status: Single    Number of children: 0    Highest education level: 9th grade   Tobacco Use    Smoking status: Never     Passive exposure: Yes    Smokeless tobacco: Never    Tobacco comments:     every few years i pick smoking back up, presently vaping, no cigarettes.   Vaping Use    Vaping status: Every Day    Substances: Nicotine    Devices: Disposable, Pre-filled or refillable cartridge   Substance and Sexual Activity    Alcohol use: Never     Comment: Sober since March 2019    Drug use: Not Currently    Sexual activity: Not Currently     Partners: Female, Male     Birth control/protection: Condom, I.U.D., Bilateral salpingectomy        Family History:  Family History   Problem Relation Age of Onset    Depression Mother     Anxiety disorder Mother     Schizophrenia Mother     Drug abuse Mother     Alcohol abuse Mother     Learning disabilities Mother     OCD Mother     Learning disabilities Father     Alcohol abuse Father     Drug abuse Father     Depression Father     ADD / ADHD Father     Self-Injurious Behavior  Sister     Suicide Attempts Sister     Alcohol abuse Maternal Grandmother         great grandmother actually [place]    Anxiety disorder Maternal Grandmother     OCD Maternal Grandmother     Alcohol abuse Maternal Grandfather        Past Surgical History:  History reviewed. No pertinent surgical history.    Problem List:  Patient Active Problem List   Diagnosis    MDD (major depressive disorder), recurrent episode, moderate    Generalized anxiety disorder with panic attacks    PTSD (post-traumatic stress disorder)    Insomnia    Borderline personality disorder  "      Allergy:   Allergies   Allergen Reactions    Rice GI Intolerance     Swollen joints        Current Medications:   Current Outpatient Medications   Medication Sig Dispense Refill    sertraline (Zoloft) 25 MG tablet Take 1 tablet by mouth Daily for 14 days. Discontinue thereafter. 14 tablet 0    buPROPion XL (WELLBUTRIN XL) 150 MG 24 hr tablet Take 1 tablet by mouth Every Morning. 90 tablet 0    FLUoxetine (PROzac) 20 MG capsule Take 1 capsule by mouth Daily for 30 days. 30 capsule 0    lamoTRIgine (LaMICtal) 100 MG tablet Take 1 tablet by mouth Daily. 90 tablet 0    Testosterone Cypionate (DEPOTESTOTERONE CYPIONATE) 200 MG/ML injection       traZODone (DESYREL) 100 MG tablet TAKE 1.5 TO 2 TABLETS BY MOUTH AT NIGHT AS NEEDED FOR SLEEP 240 tablet 0     No current facility-administered medications for this visit.       Review of Symptoms:    Review of Systems   Constitutional:  Positive for activity change and fatigue.   HENT:  Negative for tinnitus.    Eyes:  Negative for visual disturbance.   Respiratory:  Positive for shortness of breath.    Cardiovascular:  Positive for chest pain and palpitations.   Gastrointestinal:  Positive for nausea.   Endocrine: Negative for cold intolerance and heat intolerance.   Skin:  Negative for rash.   Neurological:  Negative for dizziness and confusion.   Psychiatric/Behavioral:  Positive for decreased concentration, sleep disturbance and depressed mood. Negative for hallucinations, self-injury and suicidal ideas. The patient is nervous/anxious.          Physical Exam:   Blood pressure 118/68, pulse 61, height 167.6 cm (65.98\"), weight 56.7 kg (125 lb), SpO2 95%.  Appearance: Appears documented age, appropriate hygiene and grooming.  Gait, Station, Strength: Normal gait, station and strength.  Physical Exam               Mental Status Exam:   Hygiene:   good  Cooperation:  Cooperative  Eye Contact:  Good  Psychomotor Behavior:  Restless  Affect:  Appropriate and " Restricted  Mood: sad, depressed, and anxious  Hopelessness: Denies  Speech:  Normal and Minimal  Thought Process:  Goal directed and Linear  Thought Content:  Normal  Suicidal:  None  Homicidal:  None  Hallucinations:  None  Delusion:  None  Memory:  Intact  Orientation:  Person, Place, Time, and Situation  Reliability:  good  Insight:  Good  Judgement:  Good  Impulse Control:  Good      Lab Results:   No visits with results within 1 Month(s) from this visit.   Latest known visit with results is:   No results found for any previous visit.     Results            PHQ-9 Total Score: 15    SELAM-7 Total Score: 21     Assessment & Plan    Diagnoses and all orders for this visit:    1. MDD (major depressive disorder), recurrent episode, moderate (Primary)  -     sertraline (Zoloft) 25 MG tablet; Take 1 tablet by mouth Daily for 14 days. Discontinue thereafter.  Dispense: 14 tablet; Refill: 0  -     FLUoxetine (PROzac) 20 MG capsule; Take 1 capsule by mouth Daily for 30 days.  Dispense: 30 capsule; Refill: 0    2. Generalized anxiety disorder with panic attacks  -     sertraline (Zoloft) 25 MG tablet; Take 1 tablet by mouth Daily for 14 days. Discontinue thereafter.  Dispense: 14 tablet; Refill: 0  -     FLUoxetine (PROzac) 20 MG capsule; Take 1 capsule by mouth Daily for 30 days.  Dispense: 30 capsule; Refill: 0    3. Insomnia due to other mental disorder    4. Borderline personality disorder         Liliana Gonzalez is a 34 y.o. adult who presents today in follow up for management of major depressive disorder, generalized anxiety disorder, insomnia and borderline personality disorder.     As detailed above patient reports the progressive worsening of numerous psychiatric symptoms when compared to clinical status at last visit.  Patient did successfully discontinue previously prescribed use.  And has been consistent with recently prescribed sertraline, but as detailed above continues to endorse various, rather significant  depressive and anxious symptoms as evidenced by current PHQ-9 and SELAM-7 scores.  As such, I do feel as if patient would benefit from transitioning to another SSRI medication at this time given lack of efficacy and potential side effects associated with current dosage of sertraline.  Patient was advised to decrease daily dosage of sertraline to 25 mg p.o. once daily x 14 days followed by complete discontinuation of this medicine.  We will initiate fluoxetine 20 mg p.o. once daily at this time and have advised the patient to continue this medication until her next scheduled appointment.  We will make no further medication adjustments at this time and patient will be seen again in approximately 3 weeks for reassessment where we will discuss potentially maximizing daily dosage of fluoxetine pending tolerance/response.  Patient voices understanding of this and is agreeable to today's plan.    Medications:  -Decrease sertraline to 25 mg p.o. once daily and continue for 14 days.  Discontinue thereafter.  - Initiate fluoxetine 20 mg p.o. once daily and continue until next scheduled appointment.  -Continue bupropion  mg p.o. once daily.  - Continue trazodone 150 to 200 mg p.o. nightly for management of insomnia.  - Continue lamotrigine 100 mg p.o. once daily.         TREATMENT PLAN - SHORT AND LONG-TERM GOALS:   -Continue supportive psychotherapy efforts and medications as indicated. Treatment and medication options discussed during today's visit.   -Patient acknowledged and verbally consented to continue with current treatment plan and was educated on the importance of compliance with treatment and follow-up appointments.    SUMMARY/EDUCATION/DISCUSSION:  -Pt was given appropriate time to ask questions and concerns were addressed. A thorough discussion was had that included review of disease process, need for continued monitoring and additional treatment options including use of pharmacological and non-pharmacological  approaches to care, decisions were made and agreed upon by patient and provider.   -Discussed medication options and treatment plan of prescribed medication as well as the risks, benefits, and side effects including potential falls, possible impaired driving and metabolic adversities among others; patient acknowledged and provided verbal consent.   -Patient has been educated regarding multimodal approach with healthy nutrition, healthy sleep, regular physical activity, social activities, counseling, and medications.  -Please call the office at [phone] within normal business hours (Monday-Friday, 8:00 AM - 4:30 PM) with any worsening of symptoms or onset of intolerable side effects. Please ask to leave a message with office staff.  Please allow up to 24-48 hours for response to a patient call/question/refill request.  -Safety plan has been established and discussed in detail with the patient, who is agreeable to contact support system and/or call 911 or go to the nearest ER should he/she/they have any thoughts of harm to self or others.    MEDS ORDERED DURING VISIT:  New Medications Ordered This Visit   Medications    sertraline (Zoloft) 25 MG tablet     Sig: Take 1 tablet by mouth Daily for 14 days. Discontinue thereafter.     Dispense:  14 tablet     Refill:  0    FLUoxetine (PROzac) 20 MG capsule     Sig: Take 1 capsule by mouth Daily for 30 days.     Dispense:  30 capsule     Refill:  0       FOLLOW UP:  Return in about 24 days (around 7/31/2025) for Next scheduled follow up.      Arpit Dietrich DO    This document has been electronically signed by Arpit Dietrich DO  July 25, 2025 08:53 EDT    Part of this note may be an electronic transcription/translation of spoken language to printed text using the Dragon Dictation System. Some of the data in this electronic note has been brought forward from a previous encounter, any necessary changes have been made, it has been reviewed by this provider,  and it is accurate.    Answers submitted by the patient for this visit:  Anxiety (Submitted on 6/30/2025)  Chief Complaint: Anxiety  Visit: follow-up  Frequency: constantly  Severity: severe  dry mouth: Yes  excessive worry: Yes  insomnia: Yes  irritability: Yes  malaise/fatigue: Yes  obsessions: Yes  Sleep quality: poor  Medication compliance: %  Side effects: fatigue, nausea, headaches, sexual problems, constipation/diarrhea

## 2025-07-20 DIAGNOSIS — F33.1 MDD (MAJOR DEPRESSIVE DISORDER), RECURRENT EPISODE, MODERATE: ICD-10-CM

## 2025-07-20 DIAGNOSIS — G47.00 INSOMNIA, UNSPECIFIED TYPE: ICD-10-CM

## 2025-07-21 RX ORDER — TRAZODONE HYDROCHLORIDE 100 MG/1
TABLET ORAL
Qty: 240 TABLET | Refills: 0 | Status: SHIPPED | OUTPATIENT
Start: 2025-07-21

## 2025-07-21 NOTE — TELEPHONE ENCOUNTER
Rx Refill Note  Requested Prescriptions     Pending Prescriptions Disp Refills    traZODone (DESYREL) 100 MG tablet 240 tablet 0     Sig: TAKE 1.5 TO 2 TABLETS BY MOUTH AT NIGHT AS NEEDED FOR SLEEP      Last office visit with prescribing clinician: 7/7/2025   Last telemedicine visit with prescribing clinician: Visit date not found   Next office visit with prescribing clinician: 7/31/2025     Jim Crystal MA  07/21/25, 14:03 EDT

## 2025-08-03 DIAGNOSIS — F41.0 GENERALIZED ANXIETY DISORDER WITH PANIC ATTACKS: Chronic | ICD-10-CM

## 2025-08-03 DIAGNOSIS — F33.1 MDD (MAJOR DEPRESSIVE DISORDER), RECURRENT EPISODE, MODERATE: ICD-10-CM

## 2025-08-03 DIAGNOSIS — F41.1 GENERALIZED ANXIETY DISORDER WITH PANIC ATTACKS: Chronic | ICD-10-CM
